# Patient Record
Sex: MALE | Race: WHITE | NOT HISPANIC OR LATINO | URBAN - METROPOLITAN AREA
[De-identification: names, ages, dates, MRNs, and addresses within clinical notes are randomized per-mention and may not be internally consistent; named-entity substitution may affect disease eponyms.]

---

## 2020-08-27 ENCOUNTER — OFFICE VISIT (OUTPATIENT)
Dept: URBAN - METROPOLITAN AREA TELEHEALTH 2 | Facility: TELEHEALTH | Age: 75
End: 2020-08-27
Payer: MEDICARE

## 2020-08-27 ENCOUNTER — LAB OUTSIDE AN ENCOUNTER (OUTPATIENT)
Dept: URBAN - METROPOLITAN AREA TELEHEALTH 2 | Facility: TELEHEALTH | Age: 75
End: 2020-08-27

## 2020-08-27 ENCOUNTER — OFFICE VISIT (OUTPATIENT)
Dept: URBAN - METROPOLITAN AREA TELEHEALTH 2 | Facility: TELEHEALTH | Age: 75
End: 2020-08-27

## 2020-08-27 DIAGNOSIS — K50.90 CROHN DISEASE: ICD-10-CM

## 2020-08-27 DIAGNOSIS — K50.80 CROHN'S COLITIS: ICD-10-CM

## 2020-08-27 DIAGNOSIS — R10.11 RUQ ABDOMINAL PAIN: ICD-10-CM

## 2020-08-27 DIAGNOSIS — K21.9 GERD: ICD-10-CM

## 2020-08-27 DIAGNOSIS — K92.1 BLACK STOOL: ICD-10-CM

## 2020-08-27 DIAGNOSIS — R10.13 EPIGASTRIC ABDOMINAL PAIN: ICD-10-CM

## 2020-08-27 PROBLEM — 301717006: Status: ACTIVE | Noted: 2020-08-27

## 2020-08-27 PROCEDURE — 99203 OFFICE O/P NEW LOW 30 MIN: CPT | Performed by: INTERNAL MEDICINE

## 2020-08-27 PROCEDURE — G8421 BMI NOT CALCULATED: HCPCS | Performed by: INTERNAL MEDICINE

## 2020-08-27 PROCEDURE — G8427 DOCREV CUR MEDS BY ELIG CLIN: HCPCS | Performed by: INTERNAL MEDICINE

## 2020-08-27 PROCEDURE — 1036F TOBACCO NON-USER: CPT | Performed by: INTERNAL MEDICINE

## 2020-08-27 PROCEDURE — G9903 PT SCRN TBCO ID AS NON USER: HCPCS | Performed by: INTERNAL MEDICINE

## 2020-08-27 RX ORDER — HYDROCORTISONE 100 MG/60ML
ENEMA RECTAL
Qty: 0 | Refills: 0 | Status: ACTIVE | COMMUNITY
Start: 1900-01-01

## 2020-08-27 RX ORDER — AZATHIOPRINE 50 1/1
TAKE 1 TABLET (50 MG) BY ORAL ROUTE ONCE DAILY TABLET ORAL ONCE A DAY
Status: ACTIVE | COMMUNITY

## 2020-08-27 RX ORDER — AMLODIPINE BESYLATE 10 MG/1
1 TABLET TABLET ORAL ONCE A DAY
Status: ACTIVE | COMMUNITY

## 2020-08-27 RX ORDER — HYDROCORTISONE 100 MG/60ML
ENEMA RECTAL
Qty: 0 | Refills: 0 | Status: DISCONTINUED | COMMUNITY
Start: 1900-01-01

## 2020-08-27 RX ORDER — PREDNISONE 5 MG/1
TAPER: 4 TABLETS BY MOUTH DAILY FOR 5 DAYS; THEN, 3 TABLETS BY FOR 5 DAYS; THEN, 2 TABLETS DAILY; THEN, 1 TABLET DAILY TABLET ORAL
Qty: 50 | Refills: 0 | Status: DISCONTINUED | COMMUNITY
Start: 2015-06-05

## 2020-08-27 NOTE — HPI-TODAY'S VISIT:
Mr. Butch Aquino is a 75-year-old  male who was seen today for the first time over the telephone visit for the complaints of having severe severe epigastric abdominal pain that started in the morning associated with some black tarry stools.  Patient stated he has had constipation intermittently for the past few months months he has noticed few episodes of dark stools and he reports emesis of dark black stools.  Today morning he woke up with a severe epigastric abdominal pain followed by some nausea and some black stools he denies any hematemesis denies any coffee-ground emesis denies any bright red per rectum. Patient gives history of having Crohn's disease that involving : And he follows with Dr. Nicholas albarran last seen him was a year ago.  Patient reports last colonoscopy was a year ago and he is in remission.  He was noted to have Crohn's colitis since 1981.  He reports having history of perforated colon after he had a colonoscopy in the year 2000 at that time he had surgery for that.  Patient stated his Crohn's disease in remission denies any fistulas and Crohn's disease.  Last colonoscopy he was told to be has mucosal remission currently he is not as that they have been daily for the in for the colitis.  He was also seen last in 2015 in our practice and at that time I noticed a CT scan that was done showed some gallstones and as well as liver cyst.  Patient was not aware of having gallstones he denies any intermittent right upper quadrant pain.  Denies taking any NSAIDs a daily basis except for an aspirin he is also on 20 mg of Nexium.

## 2020-08-28 ENCOUNTER — CLAIMS CREATED FROM THE CLAIM WINDOW (OUTPATIENT)
Dept: URBAN - METROPOLITAN AREA CLINIC 4 | Facility: CLINIC | Age: 75
End: 2020-08-28
Payer: MEDICARE

## 2020-08-28 ENCOUNTER — OFFICE VISIT (OUTPATIENT)
Dept: URBAN - METROPOLITAN AREA SURGERY CENTER 13 | Facility: SURGERY CENTER | Age: 75
End: 2020-08-28
Payer: MEDICARE

## 2020-08-28 DIAGNOSIS — K29.60 OTHER GASTRITIS WITHOUT BLEEDING: ICD-10-CM

## 2020-08-28 DIAGNOSIS — K22.2 ACQUIRED ESOPHAGEAL RING: ICD-10-CM

## 2020-08-28 DIAGNOSIS — R10.13 ABDOMINAL DISCOMFORT, EPIGASTRIC: ICD-10-CM

## 2020-08-28 DIAGNOSIS — K31.89 ACQUIRED DEFORMITY OF DUODENUM: ICD-10-CM

## 2020-08-28 PROCEDURE — 88305 TISSUE EXAM BY PATHOLOGIST: CPT | Performed by: PATHOLOGY

## 2020-08-28 PROCEDURE — 43239 EGD BIOPSY SINGLE/MULTIPLE: CPT | Performed by: INTERNAL MEDICINE

## 2020-08-28 PROCEDURE — 88312 SPECIAL STAINS GROUP 1: CPT | Performed by: PATHOLOGY

## 2020-08-28 PROCEDURE — G8907 PT DOC NO EVENTS ON DISCHARG: HCPCS | Performed by: INTERNAL MEDICINE

## 2020-08-28 PROCEDURE — 43248 EGD GUIDE WIRE INSERTION: CPT | Performed by: INTERNAL MEDICINE

## 2020-08-28 RX ORDER — SUCRALFATE 1 G/1
1 TABLET ON AN EMPTY STOMACH TABLET ORAL TWICE A DAY
Qty: 60 TABLET | Refills: 0 | OUTPATIENT
Start: 2020-08-28 | End: 2020-09-27

## 2020-08-28 RX ORDER — PANTOPRAZOLE 40 MG/1
1 TABLET TABLET, DELAYED RELEASE ORAL ONCE A DAY
Qty: 90 TABLET | Refills: 1 | OUTPATIENT
Start: 2020-08-28

## 2020-08-28 RX ORDER — AMLODIPINE BESYLATE 10 MG/1
1 TABLET TABLET ORAL ONCE A DAY
Status: ACTIVE | COMMUNITY

## 2020-08-28 RX ORDER — AZATHIOPRINE 50 1/1
TAKE 1 TABLET (50 MG) BY ORAL ROUTE ONCE DAILY TABLET ORAL ONCE A DAY
Status: ACTIVE | COMMUNITY

## 2020-08-29 LAB
A/G RATIO: 1.9
ALBUMIN: 4.4
ALKALINE PHOSPHATASE: 34
ALT (SGPT): 6
AST (SGOT): 15
BASO (ABSOLUTE): 0
BASOS: 0
BILIRUBIN, TOTAL: 0.3
BUN/CREATININE RATIO: 12
BUN: 28
CALCIUM: 9.4
CARBON DIOXIDE, TOTAL: 21
CHLORIDE: 108
CREATININE: 2.27
EGFR IF AFRICN AM: 31
EGFR IF NONAFRICN AM: 27
EOS (ABSOLUTE): 0.2
EOS: 3
GLOBULIN, TOTAL: 2.3
GLUCOSE: 89
HEMATOCRIT: 38.2
HEMATOLOGY COMMENTS:: (no result)
HEMOGLOBIN: 12.2
IMMATURE CELLS: (no result)
IMMATURE GRANS (ABS): 0
IMMATURE GRANULOCYTES: 0
LYMPHS (ABSOLUTE): 0.9
LYMPHS: 14
MCH: 32.5
MCHC: 31.9
MCV: 102
MONOCYTES(ABSOLUTE): 0.5
MONOCYTES: 8
NEUTROPHILS (ABSOLUTE): 4.5
NEUTROPHILS: 75
NRBC: (no result)
PLATELETS: 226
POTASSIUM: 4.5
PROTEIN, TOTAL: 6.7
RBC: 3.75
RDW: 13.1
SODIUM: 144
WBC: 6.1

## 2020-09-04 ENCOUNTER — LAB OUTSIDE AN ENCOUNTER (OUTPATIENT)
Dept: URBAN - METROPOLITAN AREA CLINIC 82 | Facility: CLINIC | Age: 75
End: 2020-09-04

## 2020-09-10 ENCOUNTER — OFFICE VISIT (OUTPATIENT)
Dept: URBAN - METROPOLITAN AREA TELEHEALTH 2 | Facility: TELEHEALTH | Age: 75
End: 2020-09-10
Payer: MEDICARE

## 2020-09-10 DIAGNOSIS — K50.90 CROHN DISEASE: ICD-10-CM

## 2020-09-10 DIAGNOSIS — K59.01 SLOW TRANSIT CONSTIPATION: ICD-10-CM

## 2020-09-10 DIAGNOSIS — K76.89 LIVER CYST: ICD-10-CM

## 2020-09-10 DIAGNOSIS — K80.20 GALLSTONES: ICD-10-CM

## 2020-09-10 PROBLEM — 79922009: Status: ACTIVE | Noted: 2020-08-27

## 2020-09-10 PROCEDURE — 99214 OFFICE O/P EST MOD 30 MIN: CPT | Performed by: INTERNAL MEDICINE

## 2020-09-10 PROCEDURE — 1036F TOBACCO NON-USER: CPT | Performed by: INTERNAL MEDICINE

## 2020-09-10 PROCEDURE — G8427 DOCREV CUR MEDS BY ELIG CLIN: HCPCS | Performed by: INTERNAL MEDICINE

## 2020-09-10 PROCEDURE — G9903 PT SCRN TBCO ID AS NON USER: HCPCS | Performed by: INTERNAL MEDICINE

## 2020-09-10 PROCEDURE — G8420 CALC BMI NORM PARAMETERS: HCPCS | Performed by: INTERNAL MEDICINE

## 2020-09-10 RX ORDER — PANTOPRAZOLE 40 MG/1
1 TABLET TABLET, DELAYED RELEASE ORAL ONCE A DAY
Qty: 90 TABLET | Refills: 1 | Status: ACTIVE | COMMUNITY
Start: 2020-08-28

## 2020-09-10 RX ORDER — AZATHIOPRINE 50 1/1
TAKE 1 TABLET (50 MG) BY ORAL ROUTE ONCE DAILY TABLET ORAL ONCE A DAY
Status: ACTIVE | COMMUNITY

## 2020-09-10 RX ORDER — SUCRALFATE 1 G/1
1 TABLET ON AN EMPTY STOMACH TABLET ORAL TWICE A DAY
Qty: 60 TABLET | Refills: 0 | Status: ACTIVE | COMMUNITY
Start: 2020-08-28 | End: 2020-09-27

## 2020-09-10 RX ORDER — AMLODIPINE BESYLATE 10 MG/1
1 TABLET TABLET ORAL ONCE A DAY
Status: ACTIVE | COMMUNITY

## 2020-09-10 RX ORDER — LINACLOTIDE 72 UG/1
1 CAPSULE AT LEAST 30 MINUTES BEFORE THE FIRST MEAL OF THE DAY ON AN EMPTY STOMACH CAPSULE, GELATIN COATED ORAL ONCE A DAY
Qty: 90 CAPSULE | Refills: 1 | OUTPATIENT
Start: 2020-09-10 | End: 2021-03-08

## 2020-09-10 NOTE — HPI-TODAY'S VISIT:
Mr. Butch Aquino is a 75-year-old  male who was seen today for follow up after the EGD. He reports melena has improved.  He denies diarrhea but c/o more constiaption.  Patient stated he has had constipation intermittently for the past few months months he has noticed few episodes of dark stools and he reports emesis of dark black stools.  Patient gives history of having Crohn's disease that involving : And he follows with Dr. Nicholas albarran last seen him was a year ago.  Patient reports last colonoscopy was a year ago and he is in remission.  He was noted to have Crohn's colitis since 1981.  He reports having history of perforated colon after he had a colonoscopy in the year 2000 at that time he had surgery for that.  Patient stated his Crohn's disease in remission denies any fistulas and Crohn's disease.  Last colonoscopy he was told to be has mucosal remission currently he is not as that they have been daily for the in for the colitis.  He was also seen last in 2015 in our practice and at that time I noticed a CT scan that was done showed some gallstones and as well as liver cyst.  Patient was not aware of having gallstones he denies any intermittent right upper quadrant pain.   Since he was last seen , EGD , RUQ US and labs were done. EGD showed erosive gastritis , negative for h.pylori infection. RUQ US showed gallstones, no thickening of the GB wall. Multiple cysts in the liver and kidney. Labs showed mild megaloblastic anemia and creatinine 2.5 . Pt reports hx of CKD.

## 2020-09-21 ENCOUNTER — ERX REFILL RESPONSE (OUTPATIENT)
Dept: URBAN - METROPOLITAN AREA SURGERY CENTER 13 | Facility: SURGERY CENTER | Age: 75
End: 2020-09-21

## 2020-09-21 RX ORDER — SUCRALFATE 1 G/1
1 TABLET ON AN EMPTY STOMACH TABLET ORAL TWICE A DAY
Qty: 60 | Refills: 0

## 2020-10-18 ENCOUNTER — ERX REFILL RESPONSE (OUTPATIENT)
Dept: URBAN - METROPOLITAN AREA SURGERY CENTER 13 | Facility: SURGERY CENTER | Age: 75
End: 2020-10-18

## 2020-10-18 RX ORDER — SUCRALFATE 1 G/1
1 TABLET ON AN EMPTY STOMACH TABLET ORAL TWICE A DAY
Qty: 60 | Refills: 0

## 2020-11-25 ENCOUNTER — ERX REFILL RESPONSE (OUTPATIENT)
Dept: URBAN - METROPOLITAN AREA SURGERY CENTER 13 | Facility: SURGERY CENTER | Age: 75
End: 2020-11-25

## 2020-11-25 RX ORDER — SUCRALFATE 1 G/1
1 TABLET ON AN EMPTY STOMACH TABLET ORAL TWICE A DAY
Qty: 60 | Refills: 0

## 2020-12-03 ENCOUNTER — OFFICE VISIT (OUTPATIENT)
Dept: URBAN - METROPOLITAN AREA TELEHEALTH 2 | Facility: TELEHEALTH | Age: 75
End: 2020-12-03

## 2020-12-10 ENCOUNTER — TELEPHONE ENCOUNTER (OUTPATIENT)
Dept: URBAN - METROPOLITAN AREA CLINIC 92 | Facility: CLINIC | Age: 75
End: 2020-12-10

## 2020-12-10 ENCOUNTER — LAB OUTSIDE AN ENCOUNTER (OUTPATIENT)
Dept: URBAN - METROPOLITAN AREA TELEHEALTH 2 | Facility: TELEHEALTH | Age: 75
End: 2020-12-10

## 2020-12-10 ENCOUNTER — OFFICE VISIT (OUTPATIENT)
Dept: URBAN - METROPOLITAN AREA TELEHEALTH 2 | Facility: TELEHEALTH | Age: 75
End: 2020-12-10
Payer: MEDICARE

## 2020-12-10 DIAGNOSIS — K50.80 CROHN'S COLITIS: ICD-10-CM

## 2020-12-10 DIAGNOSIS — K21.9 GERD: ICD-10-CM

## 2020-12-10 DIAGNOSIS — K29.60 EROSIVE GASTRITIS: ICD-10-CM

## 2020-12-10 DIAGNOSIS — K80.20 GALLSTONES: ICD-10-CM

## 2020-12-10 PROCEDURE — G9906 PT RECV TBCO CESS INTERV: HCPCS | Performed by: INTERNAL MEDICINE

## 2020-12-10 PROCEDURE — G8420 CALC BMI NORM PARAMETERS: HCPCS | Performed by: INTERNAL MEDICINE

## 2020-12-10 PROCEDURE — G8482 FLU IMMUNIZE ORDER/ADMIN: HCPCS | Performed by: INTERNAL MEDICINE

## 2020-12-10 PROCEDURE — 99213 OFFICE O/P EST LOW 20 MIN: CPT | Performed by: INTERNAL MEDICINE

## 2020-12-10 PROCEDURE — 4004F PT TOBACCO SCREEN RCVD TLK: CPT | Performed by: INTERNAL MEDICINE

## 2020-12-10 PROCEDURE — G9902 PT SCRN TBCO AND ID AS USER: HCPCS | Performed by: INTERNAL MEDICINE

## 2020-12-10 PROCEDURE — G8427 DOCREV CUR MEDS BY ELIG CLIN: HCPCS | Performed by: INTERNAL MEDICINE

## 2020-12-10 RX ORDER — PANTOPRAZOLE 40 MG/1
1 TABLET TABLET, DELAYED RELEASE ORAL ONCE A DAY
Qty: 90
Start: 2020-08-28

## 2020-12-10 RX ORDER — AMLODIPINE BESYLATE 10 MG/1
1 TABLET TABLET ORAL ONCE A DAY
Status: ACTIVE | COMMUNITY

## 2020-12-10 RX ORDER — LINACLOTIDE 72 UG/1
1 CAPSULE AT LEAST 30 MINUTES BEFORE THE FIRST MEAL OF THE DAY ON AN EMPTY STOMACH CAPSULE, GELATIN COATED ORAL ONCE A DAY
Qty: 90 CAPSULE | Refills: 1 | OUTPATIENT

## 2020-12-10 RX ORDER — SUCRALFATE 1 G/1
1 TABLET ON AN EMPTY STOMACH TABLET ORAL TWICE A DAY
Qty: 60 | Refills: 0 | Status: ACTIVE | COMMUNITY

## 2020-12-10 RX ORDER — LINACLOTIDE 72 UG/1
1 CAPSULE AT LEAST 30 MINUTES BEFORE THE FIRST MEAL OF THE DAY ON AN EMPTY STOMACH CAPSULE, GELATIN COATED ORAL ONCE A DAY
Qty: 90 CAPSULE | Refills: 1 | Status: ACTIVE | COMMUNITY
Start: 2020-09-10 | End: 2021-03-08

## 2020-12-10 RX ORDER — PANTOPRAZOLE 40 MG/1
1 TABLET TABLET, DELAYED RELEASE ORAL ONCE A DAY
Qty: 90 TABLET | Refills: 1 | Status: ACTIVE | COMMUNITY
Start: 2020-08-28

## 2020-12-10 RX ORDER — AZATHIOPRINE 50 1/1
TAKE 1 TABLET (50 MG) BY ORAL ROUTE ONCE DAILY TABLET ORAL ONCE A DAY
Status: ACTIVE | COMMUNITY

## 2020-12-10 NOTE — HPI-TODAY'S VISIT:
Mr. Butch Aquino is a 75-year-old  male who was seen today for follow up after the EGD. He reports melena has improved.  Patient gives history of having Crohn's disease that involving : And he follows with Dr. Nicholas albarran last seen him was a year ago.  Patient reports last colonoscopy was a year ago and he is in remission.  He was noted to have Crohn's colitis since 1981.  He reports having history of perforated colon after he had a colonoscopy in the year 2000 at that time he had surgery for that.  Patient stated his Crohn's disease in remission denies any fistulas and Crohn's disease.  Last colonoscopy he was told to be has mucosal remission currently he is not as that they have been daily for the in for the colitis.  He was also seen last in 2015 in our practice and at that time I noticed a CT scan that was done showed some gallstones and as well as liver cyst.  Patient was not aware of having gallstones he denies any intermittent right upper quadrant pain.   Since he was last seen , EGD , RUQ US and labs were done. EGD showed erosive gastritis , negative for h.pylori infection. RUQ US showed gallstones, no thickening of the GB wall. Multiple cysts in the liver and kidney. Labs showed mild megaloblastic anemia and creatinine 2.5 . Pt reports hx of CKD.  Only reports constipation responding to linzess 72 mcg but had diarrhea. He aslo reports choaking and spasm in the distal esophagus. EGD showed Schatzki's ring. Patient denies any weight loss and is complaint to all meds including azathioprine. Pt reports he is due for surveillance colonoscopy this year.

## 2020-12-21 ENCOUNTER — ERX REFILL RESPONSE (OUTPATIENT)
Dept: URBAN - METROPOLITAN AREA SURGERY CENTER 13 | Facility: SURGERY CENTER | Age: 75
End: 2020-12-21

## 2020-12-21 RX ORDER — SUCRALFATE 1 G/1
1 TABLET ON AN EMPTY STOMACH TABLET ORAL TWICE A DAY
Qty: 60 | Refills: 0

## 2020-12-28 ENCOUNTER — ERX REFILL RESPONSE (OUTPATIENT)
Dept: URBAN - METROPOLITAN AREA SURGERY CENTER 13 | Facility: SURGERY CENTER | Age: 75
End: 2020-12-28

## 2020-12-28 RX ORDER — SUCRALFATE 1 G/1
1 TABLET ON AN EMPTY STOMACH TABLET ORAL TWICE A DAY
Qty: 60 | Refills: 0

## 2021-01-29 ENCOUNTER — ERX REFILL RESPONSE (OUTPATIENT)
Dept: URBAN - METROPOLITAN AREA SURGERY CENTER 13 | Facility: SURGERY CENTER | Age: 76
End: 2021-01-29

## 2021-01-29 RX ORDER — SUCRALFATE 1 G/1
1 TABLET ON AN EMPTY STOMACH TABLET ORAL TWICE A DAY
Qty: 60 | Refills: 0

## 2021-02-18 ENCOUNTER — OFFICE VISIT (OUTPATIENT)
Dept: URBAN - METROPOLITAN AREA SURGERY CENTER 13 | Facility: SURGERY CENTER | Age: 76
End: 2021-02-18

## 2021-02-24 ENCOUNTER — ERX REFILL RESPONSE (OUTPATIENT)
Dept: URBAN - METROPOLITAN AREA CLINIC 82 | Facility: CLINIC | Age: 76
End: 2021-02-24

## 2021-02-24 RX ORDER — PANTOPRAZOLE SODIUM 40 MG/1
1 TABLET TABLET, DELAYED RELEASE ORAL ONCE A DAY
Qty: 90 | Refills: 1

## 2021-03-15 ENCOUNTER — ERX REFILL RESPONSE (OUTPATIENT)
Dept: URBAN - METROPOLITAN AREA CLINIC 82 | Facility: CLINIC | Age: 76
End: 2021-03-15

## 2021-03-15 RX ORDER — SUCRALFATE 1 G/1
1 TABLET ON AN EMPTY STOMACH TABLET ORAL TWICE A DAY
Qty: 60 | Refills: 0

## 2021-03-16 ENCOUNTER — OFFICE VISIT (OUTPATIENT)
Dept: URBAN - METROPOLITAN AREA SURGERY CENTER 13 | Facility: SURGERY CENTER | Age: 76
End: 2021-03-16

## 2021-04-16 ENCOUNTER — ERX REFILL RESPONSE (OUTPATIENT)
Dept: URBAN - METROPOLITAN AREA CLINIC 82 | Facility: CLINIC | Age: 76
End: 2021-04-16

## 2021-04-16 RX ORDER — SUCRALFATE 1 G/1
1 TABLET ON AN EMPTY STOMACH TABLET ORAL TWICE A DAY
Qty: 60 | Refills: 0

## 2021-04-19 ENCOUNTER — OFFICE VISIT (OUTPATIENT)
Dept: URBAN - METROPOLITAN AREA SURGERY CENTER 13 | Facility: SURGERY CENTER | Age: 76
End: 2021-04-19

## 2021-05-10 ENCOUNTER — ERX REFILL RESPONSE (OUTPATIENT)
Dept: URBAN - METROPOLITAN AREA CLINIC 82 | Facility: CLINIC | Age: 76
End: 2021-05-10

## 2021-05-10 RX ORDER — SUCRALFATE 1 G/1
1 TABLET ON AN EMPTY STOMACH TABLET ORAL TWICE A DAY
Qty: 60 | Refills: 0

## 2021-05-20 ENCOUNTER — OFFICE VISIT (OUTPATIENT)
Dept: URBAN - METROPOLITAN AREA SURGERY CENTER 13 | Facility: SURGERY CENTER | Age: 76
End: 2021-05-20

## 2021-06-13 ENCOUNTER — ERX REFILL RESPONSE (OUTPATIENT)
Dept: URBAN - METROPOLITAN AREA CLINIC 82 | Facility: CLINIC | Age: 76
End: 2021-06-13

## 2021-06-13 RX ORDER — SUCRALFATE 1 G/1
1 TABLET ON AN EMPTY STOMACH TABLET ORAL TWICE A DAY
Qty: 60 | Refills: 0

## 2021-06-15 ENCOUNTER — OFFICE VISIT (OUTPATIENT)
Dept: URBAN - METROPOLITAN AREA SURGERY CENTER 13 | Facility: SURGERY CENTER | Age: 76
End: 2021-06-15
Payer: MEDICARE

## 2021-06-15 ENCOUNTER — CLAIMS CREATED FROM THE CLAIM WINDOW (OUTPATIENT)
Dept: URBAN - METROPOLITAN AREA CLINIC 4 | Facility: CLINIC | Age: 76
End: 2021-06-15
Payer: MEDICARE

## 2021-06-15 DIAGNOSIS — K63.89 HEPATIC FLEXURE MASS: ICD-10-CM

## 2021-06-15 DIAGNOSIS — K63.89 BACTERIAL OVERGROWTH SYNDROME: ICD-10-CM

## 2021-06-15 DIAGNOSIS — K50.10 CC (CROHN'S COLITIS): ICD-10-CM

## 2021-06-15 PROCEDURE — 45380 COLONOSCOPY AND BIOPSY: CPT | Performed by: INTERNAL MEDICINE

## 2021-06-15 PROCEDURE — G8907 PT DOC NO EVENTS ON DISCHARG: HCPCS | Performed by: INTERNAL MEDICINE

## 2021-06-15 PROCEDURE — 88305 TISSUE EXAM BY PATHOLOGIST: CPT | Performed by: PATHOLOGY

## 2021-07-12 ENCOUNTER — ERX REFILL RESPONSE (OUTPATIENT)
Dept: URBAN - METROPOLITAN AREA CLINIC 82 | Facility: CLINIC | Age: 76
End: 2021-07-12

## 2021-07-12 RX ORDER — SUCRALFATE 1 G/1
1 TABLET ON AN EMPTY STOMACH TABLET ORAL TWICE A DAY
Qty: 60 | Refills: 0 | OUTPATIENT

## 2021-07-12 RX ORDER — SUCRALFATE 1 G/1
TAKE 1 TABLET BY MOUTH TWICE A DAY ON AN EMPTY STOMACH TABLET ORAL
Qty: 60 TABLET | Refills: 1 | OUTPATIENT

## 2021-07-15 ENCOUNTER — OFFICE VISIT (OUTPATIENT)
Dept: URBAN - METROPOLITAN AREA TELEHEALTH 2 | Facility: TELEHEALTH | Age: 76
End: 2021-07-15

## 2021-07-26 ENCOUNTER — OFFICE VISIT (OUTPATIENT)
Dept: URBAN - METROPOLITAN AREA TELEHEALTH 2 | Facility: TELEHEALTH | Age: 76
End: 2021-07-26
Payer: MEDICARE

## 2021-07-26 DIAGNOSIS — K29.60 EROSIVE GASTRITIS: ICD-10-CM

## 2021-07-26 DIAGNOSIS — K21.9 GERD: ICD-10-CM

## 2021-07-26 DIAGNOSIS — K80.20 GALLSTONES: ICD-10-CM

## 2021-07-26 DIAGNOSIS — K50.80 CROHN'S COLITIS: ICD-10-CM

## 2021-07-26 PROCEDURE — 99213 OFFICE O/P EST LOW 20 MIN: CPT | Performed by: INTERNAL MEDICINE

## 2021-07-26 RX ORDER — SUCRALFATE 1 G/1
TAKE 1 TABLET BY MOUTH TWICE A DAY ON AN EMPTY STOMACH TABLET ORAL
Qty: 60 TABLET | Refills: 1 | Status: ON HOLD | COMMUNITY

## 2021-07-26 RX ORDER — PANTOPRAZOLE SODIUM 40 MG/1
1 TABLET TABLET, DELAYED RELEASE ORAL ONCE A DAY
Qty: 90 | Refills: 1 | Status: ACTIVE | COMMUNITY

## 2021-07-26 RX ORDER — PANTOPRAZOLE 40 MG/1
1 TABLET TABLET, DELAYED RELEASE ORAL ONCE A DAY
Qty: 90

## 2021-07-26 RX ORDER — AMLODIPINE BESYLATE 10 MG/1
1 TABLET TABLET ORAL ONCE A DAY
Status: ACTIVE | COMMUNITY

## 2021-07-26 RX ORDER — LINACLOTIDE 72 UG/1
1 CAPSULE AT LEAST 30 MINUTES BEFORE THE FIRST MEAL OF THE DAY ON AN EMPTY STOMACH CAPSULE, GELATIN COATED ORAL ONCE A DAY
Qty: 90 CAPSULE | Refills: 1 | Status: ACTIVE | COMMUNITY

## 2021-07-26 RX ORDER — AZATHIOPRINE 50 1/1
TAKE 1 TABLET (50 MG) BY ORAL ROUTE ONCE DAILY TABLET ORAL ONCE A DAY
Status: ACTIVE | COMMUNITY

## 2021-07-26 RX ORDER — LINACLOTIDE 72 UG/1
1 CAPSULE AT LEAST 30 MINUTES BEFORE THE FIRST MEAL OF THE DAY ON AN EMPTY STOMACH CAPSULE, GELATIN COATED ORAL ONCE A DAY
Qty: 90 CAPSULE | Refills: 1 | OUTPATIENT

## 2021-07-26 NOTE — HPI-TODAY'S VISIT:
Mr. Butch Aquino was seen today for the follow-up.  He is accompanied by his son brother during the visit.  He reports GI symptoms are very well controlled except for having intermittent choking sensation.  He recently had an episode of food getting stuck into the upper esophagus however he admits to eating a big bite of meat.  He denies any daily dysphagia.  He has had an upper endoscopy from August 2020 that showed Schatzki's ring which was dilated.  He is on PPI once a day as well as Carafate.  He denies taking any other NSAIDs except for aspirin.  He recently underwent colonoscopy for some surveillance purposes for Crohn's disease which revealed multiple pseudopolyps however there was no evidence of active Crohn's and history of Crohn's in remission.  Patient also reports having some anal spasms and discomfort after having a bowel movement.  He denies having any rectal bleeding.  His bowel movements are 1 to 2/day.

## 2021-08-11 ENCOUNTER — ERX REFILL RESPONSE (OUTPATIENT)
Dept: URBAN - METROPOLITAN AREA CLINIC 82 | Facility: CLINIC | Age: 76
End: 2021-08-11

## 2021-08-11 RX ORDER — SUCRALFATE 1 G/1
TAKE 1 TABLET BY MOUTH TWICE A DAY ON AN EMPTY STOMACH TABLET ORAL
Qty: 60 TABLET | Refills: 1 | OUTPATIENT

## 2021-08-17 ENCOUNTER — ERX REFILL RESPONSE (OUTPATIENT)
Dept: URBAN - METROPOLITAN AREA CLINIC 82 | Facility: CLINIC | Age: 76
End: 2021-08-17

## 2021-08-17 RX ORDER — PANTOPRAZOLE SODIUM 40 MG/1
1 TABLET TABLET, DELAYED RELEASE ORAL ONCE A DAY
Qty: 90 | Refills: 1 | OUTPATIENT

## 2021-08-17 RX ORDER — PANTOPRAZOLE SODIUM 40 MG/1
TAKE 1 TABLET BY MOUTH EVERY DAY TABLET, DELAYED RELEASE ORAL
Qty: 90 TABLET | Refills: 2 | OUTPATIENT

## 2021-12-15 ENCOUNTER — TELEPHONE ENCOUNTER (OUTPATIENT)
Dept: URBAN - METROPOLITAN AREA CLINIC 82 | Facility: CLINIC | Age: 76
End: 2021-12-15

## 2022-01-11 ENCOUNTER — LAB OUTSIDE AN ENCOUNTER (OUTPATIENT)
Dept: URBAN - METROPOLITAN AREA TELEHEALTH 2 | Facility: TELEHEALTH | Age: 77
End: 2022-01-11

## 2022-01-11 ENCOUNTER — TELEPHONE ENCOUNTER (OUTPATIENT)
Dept: URBAN - METROPOLITAN AREA CLINIC 92 | Facility: CLINIC | Age: 77
End: 2022-01-11

## 2022-01-11 ENCOUNTER — OFFICE VISIT (OUTPATIENT)
Dept: URBAN - METROPOLITAN AREA TELEHEALTH 2 | Facility: TELEHEALTH | Age: 77
End: 2022-01-11
Payer: MEDICARE

## 2022-01-11 DIAGNOSIS — K22.4 ESOPHAGEAL SPASM: ICD-10-CM

## 2022-01-11 DIAGNOSIS — R13.14 PHARYNGOESOPHAGEAL DYSPHAGIA: ICD-10-CM

## 2022-01-11 DIAGNOSIS — K29.60 EROSIVE GASTRITIS: ICD-10-CM

## 2022-01-11 DIAGNOSIS — K50.80 CROHN'S COLITIS: ICD-10-CM

## 2022-01-11 DIAGNOSIS — D53.1 MEGALOBLASTIC ANEMIA: ICD-10-CM

## 2022-01-11 DIAGNOSIS — K59.01 SLOW TRANSIT CONSTIPATION: ICD-10-CM

## 2022-01-11 DIAGNOSIS — N28.1 RENAL CYST: ICD-10-CM

## 2022-01-11 DIAGNOSIS — K80.20 GALLSTONES: ICD-10-CM

## 2022-01-11 DIAGNOSIS — K21.9 GERD: ICD-10-CM

## 2022-01-11 DIAGNOSIS — K22.2 SCHATZKI'S RING: ICD-10-CM

## 2022-01-11 DIAGNOSIS — K76.89 LIVER CYST: ICD-10-CM

## 2022-01-11 PROBLEM — 1086791000119100: Status: ACTIVE | Noted: 2020-09-10

## 2022-01-11 PROBLEM — 235919008: Status: ACTIVE | Noted: 2020-09-10

## 2022-01-11 PROCEDURE — 99214 OFFICE O/P EST MOD 30 MIN: CPT | Performed by: INTERNAL MEDICINE

## 2022-01-11 RX ORDER — AMLODIPINE BESYLATE 10 MG/1
1 TABLET TABLET ORAL ONCE A DAY
Status: ACTIVE | COMMUNITY

## 2022-01-11 RX ORDER — PANTOPRAZOLE SODIUM 40 MG/1
TAKE 1 TABLET BY MOUTH EVERY DAY TABLET, DELAYED RELEASE ORAL
Qty: 90 TABLET | Refills: 2 | Status: ACTIVE | COMMUNITY

## 2022-01-11 RX ORDER — AZATHIOPRINE 50 1/1
TAKE 1 TABLET (50 MG) BY ORAL ROUTE ONCE DAILY TABLET ORAL ONCE A DAY
Qty: 90 TABLET | Refills: 1

## 2022-01-11 RX ORDER — AZATHIOPRINE 50 1/1
TAKE 1 TABLET (50 MG) BY ORAL ROUTE ONCE DAILY TABLET ORAL ONCE A DAY
Status: ACTIVE | COMMUNITY

## 2022-01-11 RX ORDER — LINACLOTIDE 72 UG/1
1 CAPSULE AT LEAST 30 MINUTES BEFORE THE FIRST MEAL OF THE DAY ON AN EMPTY STOMACH CAPSULE, GELATIN COATED ORAL ONCE A DAY
Qty: 90 CAPSULE | Refills: 1 | Status: ACTIVE | COMMUNITY

## 2022-01-11 RX ORDER — PANTOPRAZOLE 40 MG/1
1 TABLET TABLET, DELAYED RELEASE ORAL ONCE A DAY
Qty: 90

## 2022-01-11 RX ORDER — SUCRALFATE 1 G/1
TAKE 1 TABLET BY MOUTH TWICE A DAY ON AN EMPTY STOMACH TABLET ORAL
Qty: 60 TABLET | Refills: 1 | Status: ACTIVE | COMMUNITY

## 2022-01-11 RX ORDER — LINACLOTIDE 72 UG/1
1 CAPSULE AT LEAST 30 MINUTES BEFORE THE FIRST MEAL OF THE DAY ON AN EMPTY STOMACH CAPSULE, GELATIN COATED ORAL ONCE A DAY
Qty: 90 CAPSULE | Refills: 1 | OUTPATIENT

## 2022-01-11 NOTE — HPI-TODAY'S VISIT:
Mr. Butch Aquino was seen today for the follow-up.  He is accompanied by his son brother during the visit.  He reports GI symptoms are very well controlled except for having intermittent choking sensation.  He recently had an episode of food getting stuck into the upper esophagus however he admits to eating a big bite of meat.  He denies any daily dysphagia.  He has had an upper endoscopy from August 2020 that showed Schatzki's ring which was dilated.  He is on PPI once a day as well as Carafate.  He denies taking any other NSAIDs except for aspirin.  He recently underwent colonoscopy for some surveillance purposes for Crohn's disease which revealed multiple pseudopolyps however there was no evidence of active Crohn's and history of Crohn's in remission.  Patient also reports having some anal spasms and discomfort after having a bowel movement.  He denies having any rectal bleeding.  His bowel movements are 1 to 2/day.  1/11/22: Mr. Butch Aquino today was seen over telehealth his major complaint is having epigastric as well as chest discomfort as well as chest pain.  Patient is being followed by his cardiologist and he reports normal echocardiogram normal EKG and he cannot identify obvious triggers however this chest pain symptoms symptoms are at random happen sometimes with eating sometimes exercise.  Patient reports that he has another ultrasound echocardiogram scheduled with a cardiologist.  He is noted to have gallstones and he denies right upper quadrant pain however he reports having indigestion and epigastric and chest discomfort after eating food.  He reports having a barium swallow done at [facility] I do not have those records available.  Regarding Crohn's disease patient reports stable symptoms and his last colonoscopy shows a remission and chronic pseudo polyps.  Patient currently on azathioprine daily.  Most recent labs were about 2 months ago do not have them available he reports having normal labs with his PCP.

## 2022-04-10 ENCOUNTER — ERX REFILL RESPONSE (OUTPATIENT)
Dept: URBAN - METROPOLITAN AREA CLINIC 82 | Facility: CLINIC | Age: 77
End: 2022-04-10

## 2022-04-10 RX ORDER — SUCRALFATE 1 G/1
TAKE 1 TABLET BY MOUTH TWICE A DAY ON AN EMPTY STOMACH 30 TABLET ORAL
Qty: 60 TABLET | Refills: 1 | OUTPATIENT

## 2022-04-10 RX ORDER — SUCRALFATE 1 G/1
TAKE 1 TABLET BY MOUTH TWICE A DAY ON AN EMPTY STOMACH TABLET ORAL
Qty: 60 TABLET | Refills: 1 | OUTPATIENT

## 2022-05-02 ENCOUNTER — ERX REFILL RESPONSE (OUTPATIENT)
Dept: URBAN - METROPOLITAN AREA CLINIC 82 | Facility: CLINIC | Age: 77
End: 2022-05-02

## 2022-05-02 RX ORDER — PANTOPRAZOLE SODIUM 40 MG/1
TAKE 1 TABLET BY MOUTH EVERY DAY FOR 90 DAYS TABLET, DELAYED RELEASE ORAL
Qty: 90 TABLET | Refills: 1 | OUTPATIENT

## 2022-05-02 RX ORDER — PANTOPRAZOLE 40 MG/1
1 TABLET TABLET, DELAYED RELEASE ORAL ONCE A DAY
Qty: 90 | OUTPATIENT

## 2022-05-13 ENCOUNTER — ERX REFILL RESPONSE (OUTPATIENT)
Dept: URBAN - METROPOLITAN AREA CLINIC 82 | Facility: CLINIC | Age: 77
End: 2022-05-13

## 2022-05-13 RX ORDER — SUCRALFATE 1 G/1
TAKE 1 TABLET BY MOUTH TWICE A DAY ON AN EMPTY STOMACH TABLET ORAL
Qty: 60 TABLET | Refills: 1 | OUTPATIENT

## 2022-05-13 RX ORDER — SUCRALFATE 1 G/1
TAKE 1 TABLET BY MOUTH TWICE A DAY ON AN EMPTY STOMACH 30 TABLET ORAL
Qty: 60 TABLET | Refills: 1 | OUTPATIENT

## 2022-08-01 ENCOUNTER — OFFICE VISIT (OUTPATIENT)
Dept: URBAN - METROPOLITAN AREA CLINIC 54 | Facility: CLINIC | Age: 77
End: 2022-08-01
Payer: MEDICARE

## 2022-08-01 ENCOUNTER — WEB ENCOUNTER (OUTPATIENT)
Dept: URBAN - METROPOLITAN AREA CLINIC 54 | Facility: CLINIC | Age: 77
End: 2022-08-01

## 2022-08-01 VITALS
HEART RATE: 71 BPM | SYSTOLIC BLOOD PRESSURE: 124 MMHG | DIASTOLIC BLOOD PRESSURE: 80 MMHG | HEIGHT: 72 IN | BODY MASS INDEX: 21.54 KG/M2 | TEMPERATURE: 97.4 F | WEIGHT: 159 LBS

## 2022-08-01 DIAGNOSIS — K22.4 ESOPHAGEAL SPASM: ICD-10-CM

## 2022-08-01 DIAGNOSIS — K22.2 SCHATZKI'S RING: ICD-10-CM

## 2022-08-01 DIAGNOSIS — R10.11 RUQ ABDOMINAL PAIN: ICD-10-CM

## 2022-08-01 DIAGNOSIS — N28.1 RENAL CYST: ICD-10-CM

## 2022-08-01 DIAGNOSIS — K76.89 LIVER CYST: ICD-10-CM

## 2022-08-01 DIAGNOSIS — K50.90 CROHN DISEASE: ICD-10-CM

## 2022-08-01 DIAGNOSIS — K21.9 GERD: ICD-10-CM

## 2022-08-01 DIAGNOSIS — R13.14 PHARYNGOESOPHAGEAL DYSPHAGIA: ICD-10-CM

## 2022-08-01 DIAGNOSIS — K80.20 GALLSTONES: ICD-10-CM

## 2022-08-01 DIAGNOSIS — K29.60 EROSIVE GASTRITIS: ICD-10-CM

## 2022-08-01 DIAGNOSIS — K59.01 SLOW TRANSIT CONSTIPATION: ICD-10-CM

## 2022-08-01 DIAGNOSIS — R10.13 EPIGASTRIC ABDOMINAL PAIN: ICD-10-CM

## 2022-08-01 PROCEDURE — 99213 OFFICE O/P EST LOW 20 MIN: CPT | Performed by: INTERNAL MEDICINE

## 2022-08-01 RX ORDER — SUCRALFATE 1 G/1
TAKE 1 TABLET BY MOUTH TWICE A DAY ON AN EMPTY STOMACH TABLET ORAL
Qty: 60 TABLET | Refills: 1 | Status: ACTIVE | COMMUNITY

## 2022-08-01 RX ORDER — CYANOCOBALAMIN (VITAMIN B-12) 100 MCG
1 TABLET TABLET ORAL ONCE A DAY
Refills: 0 | Status: ACTIVE | COMMUNITY
Start: 1900-01-01

## 2022-08-01 RX ORDER — FOLIC ACID 1 MG/1
1 TABLET TABLET ORAL ONCE A DAY
Status: ACTIVE | COMMUNITY

## 2022-08-01 RX ORDER — TAMSULOSIN HYDROCHLORIDE 0.4 MG/1
TAKE 1 CAPSULE (0.4 MG TOTAL) BY MOUTH DAILY AFTER BREAKFAST CAPSULE ORAL
Qty: 30 EACH | Refills: 0 | Status: ACTIVE | COMMUNITY

## 2022-08-01 RX ORDER — ASPIRIN 325 MG/1
1 TABLET TABLET, FILM COATED ORAL ONCE A DAY
Refills: 0 | Status: ACTIVE | COMMUNITY
Start: 1900-01-01

## 2022-08-01 RX ORDER — COLESTIPOL HYDROCHLORIDE 1 G/1
TAKE 1 TABLET BY MOUTH EVERY DAY TABLET, FILM COATED ORAL
Qty: 90 EACH | Refills: 0 | Status: ACTIVE | COMMUNITY

## 2022-08-01 RX ORDER — MEGESTROL ACETATE 40 MG/1
TAKE 1 TABLET BY MOUTH DAILY IN THE MORNING TABLET ORAL
Qty: 30 EACH | Refills: 4 | Status: ACTIVE | COMMUNITY

## 2022-08-01 RX ORDER — CHOLECALCIFEROL (VITAMIN D3) 50 MCG
1 TABLET CAPSULE ORAL ONCE A DAY
Refills: 0 | Status: ACTIVE | COMMUNITY
Start: 1900-01-01

## 2022-08-01 RX ORDER — HYOSCYAMINE SULFATE 0.38 MG/1
TAKE 1 TABLET BY MOUTH TWICE A DAY TABLET ORAL
Qty: 60 EACH | Refills: 0 | Status: ACTIVE | COMMUNITY

## 2022-08-01 RX ORDER — AMLODIPINE BESYLATE 10 MG/1
1 TABLET TABLET ORAL ONCE A DAY
Status: ACTIVE | COMMUNITY

## 2022-08-01 RX ORDER — FINASTERIDE 5 MG/1
TAKE 1 TABLET (5 MG TOTAL) BY MOUTH DAILY TABLET, FILM COATED ORAL
Qty: 30 EACH | Refills: 0 | Status: ACTIVE | COMMUNITY

## 2022-08-01 RX ORDER — AZATHIOPRINE 50 1/1
TAKE 1 TABLET (50 MG) BY ORAL ROUTE ONCE DAILY TABLET ORAL ONCE A DAY
Qty: 90 TABLET | Refills: 1 | Status: ACTIVE | COMMUNITY

## 2022-08-01 RX ORDER — ESOMEPRAZOLE MAGNESIUM 40 MG/1
1 CAPSULE CAPSULE, DELAYED RELEASE ORAL ONCE A DAY
Status: ACTIVE | COMMUNITY

## 2022-08-01 NOTE — HPI-TODAY'S VISIT:
Pt of Dr Huizar , seen today for location and expediency.  History of Crohn's in 1980s ongoing issues of anal discomfort with defecation without prior history of fissure fistula or abscess.  Intermittent incontinence.  No rectal bleeding.  Last colonoscopy June 2021 with pseudopolyps but otherwise normal colonoscopy without gross inflammation and without active inflammation or dysplasia on multiple random biopsies.  Prior history of perforation of colon 20 or so years ago during a colonoscopy requiring limited colon resection.  No other surgery for Crohn's disease.  History of dysphagia in the past with Schatzki's ring dilated in 2020 with good results.  No current dysphagia although having difficulty eating much solid food because of poor dentition.  Some heartburn on PPI and sucralfate.  Also taking hyoscyamine and occasionally Linzess for constipation.  MiraLAX and stool softeners have not been as effective for this.  Recent hospitalization for urinary retention caused by enlarged prostate with corresponding azotemia under care of urology and nephrology.  Additional history of gallstones, asymptomatic at present.  No active cardiac or respiratory problems.  On azathioprine 50 mg daily for many years with normal labs.

## 2022-08-01 NOTE — PHYSICAL EXAM HENT:
Head, normocephalic, atraumatic, Face, Face within normal limits, Ears, External ears within normal limits Missing/carious teeth

## 2022-08-04 ENCOUNTER — OFFICE VISIT (OUTPATIENT)
Dept: URBAN - METROPOLITAN AREA CLINIC 54 | Facility: CLINIC | Age: 77
End: 2022-08-04

## 2022-08-07 ENCOUNTER — ERX REFILL RESPONSE (OUTPATIENT)
Dept: URBAN - METROPOLITAN AREA CLINIC 82 | Facility: CLINIC | Age: 77
End: 2022-08-07

## 2022-08-07 RX ORDER — PANTOPRAZOLE SODIUM 40 MG/1
TAKE 1 TABLET BY MOUTH EVERY DAY TABLET, DELAYED RELEASE ORAL
Qty: 90 TABLET | Refills: 0 | OUTPATIENT

## 2022-08-09 ENCOUNTER — TELEPHONE ENCOUNTER (OUTPATIENT)
Dept: URBAN - METROPOLITAN AREA CLINIC 78 | Facility: CLINIC | Age: 77
End: 2022-08-09

## 2022-08-24 ENCOUNTER — OFFICE VISIT (OUTPATIENT)
Dept: URBAN - NONMETROPOLITAN AREA CLINIC 4 | Facility: CLINIC | Age: 77
End: 2022-08-24
Payer: MEDICARE

## 2022-08-24 ENCOUNTER — LAB OUTSIDE AN ENCOUNTER (OUTPATIENT)
Dept: URBAN - NONMETROPOLITAN AREA CLINIC 4 | Facility: CLINIC | Age: 77
End: 2022-08-24

## 2022-08-24 VITALS
DIASTOLIC BLOOD PRESSURE: 87 MMHG | HEART RATE: 73 BPM | HEIGHT: 72 IN | WEIGHT: 160.4 LBS | TEMPERATURE: 97.4 F | SYSTOLIC BLOOD PRESSURE: 144 MMHG | BODY MASS INDEX: 21.73 KG/M2

## 2022-08-24 DIAGNOSIS — K76.89 LIVER CYST: ICD-10-CM

## 2022-08-24 DIAGNOSIS — K50.80 CROHN'S COLITIS: ICD-10-CM

## 2022-08-24 DIAGNOSIS — R13.14 PHARYNGOESOPHAGEAL DYSPHAGIA: ICD-10-CM

## 2022-08-24 DIAGNOSIS — K22.4 ESOPHAGEAL SPASM: ICD-10-CM

## 2022-08-24 DIAGNOSIS — K59.01 SLOW TRANSIT CONSTIPATION: ICD-10-CM

## 2022-08-24 DIAGNOSIS — K22.2 SCHATZKI'S RING: ICD-10-CM

## 2022-08-24 DIAGNOSIS — N28.1 RENAL CYST: ICD-10-CM

## 2022-08-24 DIAGNOSIS — K21.9 GERD: ICD-10-CM

## 2022-08-24 PROCEDURE — 99214 OFFICE O/P EST MOD 30 MIN: CPT | Performed by: REGISTERED NURSE

## 2022-08-24 RX ORDER — COLESTIPOL HYDROCHLORIDE 1 G/1
TAKE 1 TABLET BY MOUTH EVERY DAY TABLET, FILM COATED ORAL
Qty: 90 EACH | Refills: 0 | Status: ACTIVE | COMMUNITY

## 2022-08-24 RX ORDER — NITROFURANTOIN (MONOHYDRATE/MACROCRYSTALS) 75; 25 MG/1; MG/1
1 CAPSULES CAPSULE ORAL TWICE A DAY
Status: ACTIVE | COMMUNITY

## 2022-08-24 RX ORDER — FINASTERIDE 5 MG/1
TAKE 1 TABLET (5 MG TOTAL) BY MOUTH DAILY TABLET, FILM COATED ORAL
Qty: 30 EACH | Refills: 0 | Status: ACTIVE | COMMUNITY

## 2022-08-24 RX ORDER — HYOSCYAMINE SULFATE 0.38 MG/1
TAKE 1 TABLET BY MOUTH TWICE A DAY TABLET ORAL
Qty: 60 EACH | Refills: 0 | Status: ACTIVE | COMMUNITY

## 2022-08-24 RX ORDER — PANTOPRAZOLE SODIUM 40 MG/1
TAKE 1 TABLET BY MOUTH EVERY DAY TABLET, DELAYED RELEASE ORAL
Qty: 90 TABLET | Refills: 0 | Status: ACTIVE | COMMUNITY

## 2022-08-24 RX ORDER — FOLIC ACID 1 MG/1
1 TABLET TABLET ORAL ONCE A DAY
Status: ACTIVE | COMMUNITY

## 2022-08-24 RX ORDER — CHOLECALCIFEROL (VITAMIN D3) 50 MCG
1 TABLET CAPSULE ORAL ONCE A DAY
Refills: 0 | Status: ACTIVE | COMMUNITY
Start: 1900-01-01

## 2022-08-24 RX ORDER — AZATHIOPRINE 50 1/1
TAKE 1 TABLET (50 MG) BY ORAL ROUTE ONCE DAILY TABLET ORAL ONCE A DAY
Qty: 90 TABLET | Refills: 1 | Status: ACTIVE | COMMUNITY

## 2022-08-24 RX ORDER — SODIUM, POTASSIUM,MAG SULFATES 17.5-3.13G
177ML SOLUTION, RECONSTITUTED, ORAL ORAL ONCE A DAY
Qty: 354 ML | Refills: 0 | OUTPATIENT
Start: 2022-08-24 | End: 2022-08-26

## 2022-08-24 RX ORDER — SUCRALFATE 1 G/1
TAKE 1 TABLET BY MOUTH TWICE A DAY ON AN EMPTY STOMACH TABLET ORAL
Qty: 60 TABLET | Refills: 1 | Status: ACTIVE | COMMUNITY

## 2022-08-24 RX ORDER — CYANOCOBALAMIN (VITAMIN B-12) 100 MCG
1 TABLET TABLET ORAL ONCE A DAY
Refills: 0 | Status: ACTIVE | COMMUNITY
Start: 1900-01-01

## 2022-08-24 RX ORDER — MEGESTROL ACETATE 40 MG/1
TAKE 1 TABLET BY MOUTH DAILY IN THE MORNING TABLET ORAL
Qty: 30 EACH | Refills: 4 | Status: ACTIVE | COMMUNITY

## 2022-08-24 RX ORDER — AMLODIPINE BESYLATE 10 MG/1
1 TABLET TABLET ORAL ONCE A DAY
Status: ACTIVE | COMMUNITY

## 2022-08-24 RX ORDER — ASPIRIN 325 MG/1
1 TABLET TABLET, FILM COATED ORAL ONCE A DAY
Refills: 0 | Status: ACTIVE | COMMUNITY
Start: 1900-01-01

## 2022-08-24 RX ORDER — TAMSULOSIN HYDROCHLORIDE 0.4 MG/1
TAKE 1 CAPSULE (0.4 MG TOTAL) BY MOUTH DAILY AFTER BREAKFAST CAPSULE ORAL
Qty: 30 EACH | Refills: 0 | Status: ACTIVE | COMMUNITY

## 2022-08-24 RX ORDER — ESOMEPRAZOLE MAGNESIUM 40 MG/1
1 CAPSULE CAPSULE, DELAYED RELEASE ORAL ONCE A DAY
Status: ACTIVE | COMMUNITY

## 2022-08-24 NOTE — HPI-TODAY'S VISIT:
Pt of Dr Huizar , seen today for location and expediency.  History of Crohn's in 1980s ongoing issues of anal discomfort with defecation without prior history of fissure fistula or abscess.  Intermittent incontinence.  No rectal bleeding.  Last colonoscopy June 2021 with pseudopolyps but otherwise normal colonoscopy without gross inflammation and without active inflammation or dysplasia on multiple random biopsies.  Prior history of perforation of colon 20 or so years ago during a colonoscopy requiring limited colon resection.  No other surgery for Crohn's disease.  History of dysphagia in the past with Schatzki's ring dilated in 2020 with good results.  No current dysphagia although having difficulty eating much solid food because of poor dentition.  Some heartburn on PPI and sucralfate.  Also taking hyoscyamine and occasionally Linzess for constipation.  MiraLAX and stool softeners have not been as effective for this.  Recent hospitalization for urinary retention caused by enlarged prostate with corresponding azotemia under care of urology and nephrology.  Additional history of gallstones, asymptomatic at present.  No active cardiac or respiratory problems.  On azathioprine 50 mg daily for many years with normal labs.  8/24/22: Pt RTC for f/u. Denies any further rectal pain. Staes he only has a BM with Linzess, which he takes qod. Feels an urge to have BM on days he does not take Linzess, but can not go. Denies bloody stools or abdominal pain. His dental implants came out and he is having to eat mostly liquids/soft foods. He reports intermittent chest pain and dysphagia. Has had cardiac workup in past with no ischemia. He takes PPI daily. He takes bASA daily.

## 2022-08-25 LAB — C-REACTIVE PROTEIN, QUANT: 6

## 2022-09-07 ENCOUNTER — OFFICE VISIT (OUTPATIENT)
Dept: URBAN - NONMETROPOLITAN AREA CLINIC 4 | Facility: CLINIC | Age: 77
End: 2022-09-07

## 2022-09-08 ENCOUNTER — OFFICE VISIT (OUTPATIENT)
Dept: URBAN - METROPOLITAN AREA SURGERY CENTER 14 | Facility: SURGERY CENTER | Age: 77
End: 2022-09-08

## 2022-09-12 ENCOUNTER — OFFICE VISIT (OUTPATIENT)
Dept: URBAN - METROPOLITAN AREA CLINIC 54 | Facility: CLINIC | Age: 77
End: 2022-09-12

## 2022-09-14 ENCOUNTER — OUT OF OFFICE VISIT (OUTPATIENT)
Dept: URBAN - NONMETROPOLITAN AREA MEDICAL CENTER 3 | Facility: MEDICAL CENTER | Age: 77
End: 2022-09-14
Payer: MEDICARE

## 2022-09-14 DIAGNOSIS — R93.3 ABN FINDINGS-GI TRACT: ICD-10-CM

## 2022-09-14 DIAGNOSIS — K50.80 CROHN'S COLITIS: ICD-10-CM

## 2022-09-14 DIAGNOSIS — R93.2 ABN FIND-BILIARY TRACT: ICD-10-CM

## 2022-09-14 PROCEDURE — 99223 1ST HOSP IP/OBS HIGH 75: CPT | Performed by: PHYSICIAN ASSISTANT

## 2022-09-14 PROCEDURE — G8427 DOCREV CUR MEDS BY ELIG CLIN: HCPCS | Performed by: PHYSICIAN ASSISTANT

## 2022-09-14 PROCEDURE — 99232 SBSQ HOSP IP/OBS MODERATE 35: CPT | Performed by: PHYSICIAN ASSISTANT

## 2022-09-21 ENCOUNTER — OFFICE VISIT (OUTPATIENT)
Dept: URBAN - METROPOLITAN AREA SURGERY CENTER 14 | Facility: SURGERY CENTER | Age: 77
End: 2022-09-21

## 2022-10-10 ENCOUNTER — TELEPHONE ENCOUNTER (OUTPATIENT)
Dept: URBAN - NONMETROPOLITAN AREA CLINIC 4 | Facility: CLINIC | Age: 77
End: 2022-10-10

## 2022-10-14 ENCOUNTER — OUT OF OFFICE VISIT (OUTPATIENT)
Dept: URBAN - NONMETROPOLITAN AREA MEDICAL CENTER 3 | Facility: MEDICAL CENTER | Age: 77
End: 2022-10-14
Payer: MEDICARE

## 2022-10-14 DIAGNOSIS — D62 ABLA (ACUTE BLOOD LOSS ANEMIA): ICD-10-CM

## 2022-10-14 DIAGNOSIS — K50.811 CROHN'S DISEASE OF BOTH SMALL AND LARGE INTESTINE WITH RECTAL BLEEDING: ICD-10-CM

## 2022-10-14 PROCEDURE — G8427 DOCREV CUR MEDS BY ELIG CLIN: HCPCS | Performed by: PHYSICIAN ASSISTANT

## 2022-10-14 PROCEDURE — 99204 OFFICE O/P NEW MOD 45 MIN: CPT | Performed by: PHYSICIAN ASSISTANT

## 2022-10-14 PROCEDURE — 99222 1ST HOSP IP/OBS MODERATE 55: CPT | Performed by: PHYSICIAN ASSISTANT

## 2022-10-15 ENCOUNTER — OUT OF OFFICE VISIT (OUTPATIENT)
Dept: URBAN - NONMETROPOLITAN AREA MEDICAL CENTER 3 | Facility: MEDICAL CENTER | Age: 77
End: 2022-10-15
Payer: MEDICARE

## 2022-10-15 DIAGNOSIS — D62 ABLA (ACUTE BLOOD LOSS ANEMIA): ICD-10-CM

## 2022-10-15 DIAGNOSIS — K50.811 CROHN'S DISEASE OF BOTH SMALL AND LARGE INTESTINE WITH RECTAL BLEEDING: ICD-10-CM

## 2022-10-15 PROCEDURE — 99214 OFFICE O/P EST MOD 30 MIN: CPT | Performed by: PHYSICIAN ASSISTANT

## 2022-10-17 ENCOUNTER — OFFICE VISIT (OUTPATIENT)
Dept: URBAN - NONMETROPOLITAN AREA CLINIC 4 | Facility: CLINIC | Age: 77
End: 2022-10-17
Payer: MEDICARE

## 2022-10-17 VITALS
WEIGHT: 161.2 LBS | TEMPERATURE: 97.3 F | BODY MASS INDEX: 21.83 KG/M2 | DIASTOLIC BLOOD PRESSURE: 77 MMHG | SYSTOLIC BLOOD PRESSURE: 143 MMHG | HEIGHT: 72 IN | HEART RATE: 85 BPM

## 2022-10-17 DIAGNOSIS — K62.89 RECTAL PAIN: ICD-10-CM

## 2022-10-17 DIAGNOSIS — K62.5 RECTAL BLEEDING: ICD-10-CM

## 2022-10-17 DIAGNOSIS — K21.9 GERD: ICD-10-CM

## 2022-10-17 DIAGNOSIS — N18.4 STAGE 4 CHRONIC KIDNEY DISEASE: ICD-10-CM

## 2022-10-17 DIAGNOSIS — R13.14 PHARYNGOESOPHAGEAL DYSPHAGIA: ICD-10-CM

## 2022-10-17 DIAGNOSIS — K22.2 SCHATZKI'S RING: ICD-10-CM

## 2022-10-17 DIAGNOSIS — N28.1 RENAL CYST: ICD-10-CM

## 2022-10-17 DIAGNOSIS — K59.09 CHANGE IN BOWEL MOVEMENTS INTERMITTENT CONSTIPATION. URGENCY IN THE MORNING.: ICD-10-CM

## 2022-10-17 DIAGNOSIS — I48.91 ATRIAL FIBRILLATION, UNSPECIFIED TYPE: ICD-10-CM

## 2022-10-17 DIAGNOSIS — K22.4 ESOPHAGEAL SPASM: ICD-10-CM

## 2022-10-17 DIAGNOSIS — K76.89 LIVER CYST: ICD-10-CM

## 2022-10-17 DIAGNOSIS — K50.80 CROHN'S COLITIS: ICD-10-CM

## 2022-10-17 PROBLEM — 49436004: Status: ACTIVE | Noted: 2022-10-17

## 2022-10-17 PROBLEM — 722223000 RENAL CYST: Status: ACTIVE | Noted: 2020-12-10

## 2022-10-17 PROBLEM — 266434009: Status: ACTIVE | Noted: 2020-12-10

## 2022-10-17 PROBLEM — 40739000: Status: ACTIVE | Noted: 2021-07-26

## 2022-10-17 PROBLEM — 85057007 LIVER CYST: Status: ACTIVE | Noted: 2020-09-10

## 2022-10-17 PROBLEM — 235623002: Status: ACTIVE | Noted: 2020-12-10

## 2022-10-17 PROBLEM — 35298007: Status: ACTIVE | Noted: 2020-09-10

## 2022-10-17 PROBLEM — 431857002: Status: ACTIVE | Noted: 2022-10-17

## 2022-10-17 PROCEDURE — 99214 OFFICE O/P EST MOD 30 MIN: CPT | Performed by: REGISTERED NURSE

## 2022-10-17 RX ORDER — MEGESTROL ACETATE 40 MG/1
TAKE 1 TABLET BY MOUTH DAILY IN THE MORNING TABLET ORAL
Qty: 30 EACH | Refills: 4 | Status: ACTIVE | COMMUNITY

## 2022-10-17 RX ORDER — AZATHIOPRINE 50 1/1
TAKE 1 TABLET (50 MG) BY ORAL ROUTE ONCE DAILY TABLET ORAL ONCE A DAY
Qty: 90 TABLET | Refills: 1 | Status: ACTIVE | COMMUNITY

## 2022-10-17 RX ORDER — CYANOCOBALAMIN (VITAMIN B-12) 100 MCG
1 TABLET TABLET ORAL ONCE A DAY
Refills: 0 | Status: ACTIVE | COMMUNITY
Start: 1900-01-01

## 2022-10-17 RX ORDER — SUCRALFATE 1 G/1
TAKE 1 TABLET BY MOUTH TWICE A DAY ON AN EMPTY STOMACH TABLET ORAL
Qty: 60 TABLET | Refills: 1 | Status: ACTIVE | COMMUNITY

## 2022-10-17 RX ORDER — ASPIRIN 325 MG/1
1 TABLET TABLET, FILM COATED ORAL ONCE A DAY
Refills: 0 | Status: ACTIVE | COMMUNITY
Start: 1900-01-01

## 2022-10-17 RX ORDER — PANTOPRAZOLE SODIUM 40 MG/1
TAKE 1 TABLET BY MOUTH EVERY DAY TABLET, DELAYED RELEASE ORAL
Qty: 90 TABLET | Refills: 0 | Status: ACTIVE | COMMUNITY

## 2022-10-17 RX ORDER — FINASTERIDE 5 MG/1
TAKE 1 TABLET (5 MG TOTAL) BY MOUTH DAILY TABLET, FILM COATED ORAL
Qty: 30 EACH | Refills: 0 | Status: ACTIVE | COMMUNITY

## 2022-10-17 RX ORDER — FOLIC ACID 1 MG/1
1 TABLET TABLET ORAL ONCE A DAY
Status: ACTIVE | COMMUNITY

## 2022-10-17 RX ORDER — COLESTIPOL HYDROCHLORIDE 1 G/1
TAKE 1 TABLET BY MOUTH EVERY DAY TABLET, FILM COATED ORAL
Qty: 90 EACH | Refills: 0 | Status: ACTIVE | COMMUNITY

## 2022-10-17 RX ORDER — ESOMEPRAZOLE MAGNESIUM 40 MG/1
1 CAPSULE CAPSULE, DELAYED RELEASE ORAL ONCE A DAY
Status: ACTIVE | COMMUNITY

## 2022-10-17 RX ORDER — TAMSULOSIN HYDROCHLORIDE 0.4 MG/1
TAKE 1 CAPSULE (0.4 MG TOTAL) BY MOUTH DAILY AFTER BREAKFAST CAPSULE ORAL
Qty: 30 EACH | Refills: 0 | Status: ACTIVE | COMMUNITY

## 2022-10-17 RX ORDER — AMLODIPINE BESYLATE 10 MG/1
1 TABLET TABLET ORAL ONCE A DAY
Status: ACTIVE | COMMUNITY

## 2022-10-17 RX ORDER — HYOSCYAMINE SULFATE 0.38 MG/1
TAKE 1 TABLET BY MOUTH TWICE A DAY TABLET ORAL
Qty: 60 EACH | Refills: 0 | Status: ACTIVE | COMMUNITY

## 2022-10-17 RX ORDER — CHOLECALCIFEROL (VITAMIN D3) 50 MCG
1 TABLET CAPSULE ORAL ONCE A DAY
Refills: 0 | Status: ACTIVE | COMMUNITY
Start: 1900-01-01

## 2022-10-17 RX ORDER — NITROFURANTOIN (MONOHYDRATE/MACROCRYSTALS) 75; 25 MG/1; MG/1
1 CAPSULES CAPSULE ORAL TWICE A DAY
Status: ACTIVE | COMMUNITY

## 2022-10-17 NOTE — HPI-TODAY'S VISIT:
Pt of Dr Huizar , seen today for location and expediency.  History of Crohn's in 1980s ongoing issues of anal discomfort with defecation without prior history of fissure fistula or abscess.  Intermittent incontinence.  No rectal bleeding.  Last colonoscopy June 2021 with pseudopolyps but otherwise normal colonoscopy without gross inflammation and without active inflammation or dysplasia on multiple random biopsies.  Prior history of perforation of colon 20 or so years ago during a colonoscopy requiring limited colon resection.  No other surgery for Crohn's disease.  History of dysphagia in the past with Schatzki's ring dilated in 2020 with good results.  No current dysphagia although having difficulty eating much solid food because of poor dentition.  Some heartburn on PPI and sucralfate.  Also taking hyoscyamine and occasionally Linzess for constipation.  MiraLAX and stool softeners have not been as effective for this.  Recent hospitalization for urinary retention caused by enlarged prostate with corresponding azotemia under care of urology and nephrology.  Additional history of gallstones, asymptomatic at present.  No active cardiac or respiratory problems.  On azathioprine 50 mg daily for many years with normal labs.  8/24/22: Pt RTC for f/u. Denies any further rectal pain. Staes he only has a BM with Linzess, which he takes qod. Feels an urge to have BM on days he does not take Linzess, but can not go. Denies bloody stools or abdominal pain. His dental implants came out and he is having to eat mostly liquids/soft foods. He reports intermittent chest pain and dysphagia. Has had cardiac workup in past with no ischemia. He takes PPI daily. He takes bASA daily.  10/17/22: Pt RTC for hospital f/u. He was hospitalized in September with A/CKD and was started on dialysis. He was also found to have Afb with RVR and was started on Eliquis. He was recently admitted to [facility] at few days ago with c/o rectal bleeding and anemia. States he had started Linzess PTA that likely caused diarrhea with associated low volume rectal bleeding. Since discharge, he denies any further rectal bleeding. He continues to c/o constipation and rectal pain. He is using hydrocortisone supp. He has not taken Linzess since last Sunday. He has rectal rockets at home, but has not used them yet.

## 2022-10-20 ENCOUNTER — OFFICE VISIT (OUTPATIENT)
Dept: URBAN - METROPOLITAN AREA SURGERY CENTER 14 | Facility: SURGERY CENTER | Age: 77
End: 2022-10-20

## 2022-10-21 ENCOUNTER — ERX REFILL RESPONSE (OUTPATIENT)
Dept: URBAN - METROPOLITAN AREA CLINIC 82 | Facility: CLINIC | Age: 77
End: 2022-10-21

## 2022-10-21 RX ORDER — AZATHIOPRINE 50 MG/1
TAKE 1 TABLET BY MOUTH EVERY DAY TABLET ORAL
Qty: 90 TABLET | Refills: 1 | OUTPATIENT

## 2022-10-21 RX ORDER — AZATHIOPRINE 50 1/1
TAKE 1 TABLET (50 MG) BY ORAL ROUTE ONCE DAILY TABLET ORAL ONCE A DAY
Qty: 90 TABLET | Refills: 1 | OUTPATIENT

## 2022-10-24 ENCOUNTER — TELEPHONE ENCOUNTER (OUTPATIENT)
Dept: URBAN - NONMETROPOLITAN AREA CLINIC 4 | Facility: CLINIC | Age: 77
End: 2022-10-24

## 2022-10-26 ENCOUNTER — OFFICE VISIT (OUTPATIENT)
Dept: URBAN - METROPOLITAN AREA MEDICAL CENTER 23 | Facility: MEDICAL CENTER | Age: 77
End: 2022-10-26
Payer: MEDICARE

## 2022-10-26 DIAGNOSIS — K92.1 ACUTE MELENA: ICD-10-CM

## 2022-10-26 DIAGNOSIS — K22.2 ACQUIRED ESOPHAGEAL RING: ICD-10-CM

## 2022-10-26 DIAGNOSIS — B37.81 CANDIDA: ICD-10-CM

## 2022-10-26 DIAGNOSIS — D12.0 ADENOMA OF CECUM: ICD-10-CM

## 2022-10-26 DIAGNOSIS — K50.80 CROHN'S COLITIS: ICD-10-CM

## 2022-10-26 PROCEDURE — 43239 EGD BIOPSY SINGLE/MULTIPLE: CPT | Performed by: INTERNAL MEDICINE

## 2022-10-26 PROCEDURE — 45380 COLONOSCOPY AND BIOPSY: CPT | Performed by: INTERNAL MEDICINE

## 2022-10-26 RX ORDER — PANTOPRAZOLE SODIUM 40 MG/1
TAKE 1 TABLET BY MOUTH EVERY DAY TABLET, DELAYED RELEASE ORAL
Qty: 90 TABLET | Refills: 0 | Status: ACTIVE | COMMUNITY

## 2022-10-26 RX ORDER — COLESTIPOL HYDROCHLORIDE 1 G/1
TAKE 1 TABLET BY MOUTH EVERY DAY TABLET, FILM COATED ORAL
Qty: 90 EACH | Refills: 0 | Status: ACTIVE | COMMUNITY

## 2022-10-26 RX ORDER — AZATHIOPRINE 50 MG/1
TAKE 1 TABLET BY MOUTH EVERY DAY TABLET ORAL
Qty: 90 TABLET | Refills: 1 | Status: ACTIVE | COMMUNITY

## 2022-10-26 RX ORDER — NITROFURANTOIN (MONOHYDRATE/MACROCRYSTALS) 75; 25 MG/1; MG/1
1 CAPSULES CAPSULE ORAL TWICE A DAY
Status: ACTIVE | COMMUNITY

## 2022-10-26 RX ORDER — FINASTERIDE 5 MG/1
TAKE 1 TABLET (5 MG TOTAL) BY MOUTH DAILY TABLET, FILM COATED ORAL
Qty: 30 EACH | Refills: 0 | Status: ACTIVE | COMMUNITY

## 2022-10-26 RX ORDER — SUCRALFATE 1 G/1
TAKE 1 TABLET BY MOUTH TWICE A DAY ON AN EMPTY STOMACH TABLET ORAL
Qty: 60 TABLET | Refills: 1 | Status: ACTIVE | COMMUNITY

## 2022-10-26 RX ORDER — FOLIC ACID 1 MG/1
1 TABLET TABLET ORAL ONCE A DAY
Status: ACTIVE | COMMUNITY

## 2022-10-26 RX ORDER — ESOMEPRAZOLE MAGNESIUM 40 MG/1
1 CAPSULE CAPSULE, DELAYED RELEASE ORAL ONCE A DAY
Status: ACTIVE | COMMUNITY

## 2022-10-26 RX ORDER — CYANOCOBALAMIN (VITAMIN B-12) 100 MCG
1 TABLET TABLET ORAL ONCE A DAY
Refills: 0 | Status: ACTIVE | COMMUNITY
Start: 1900-01-01

## 2022-10-26 RX ORDER — ASPIRIN 325 MG/1
1 TABLET TABLET, FILM COATED ORAL ONCE A DAY
Refills: 0 | Status: ACTIVE | COMMUNITY
Start: 1900-01-01

## 2022-10-26 RX ORDER — MEGESTROL ACETATE 40 MG/1
TAKE 1 TABLET BY MOUTH DAILY IN THE MORNING TABLET ORAL
Qty: 30 EACH | Refills: 4 | Status: ACTIVE | COMMUNITY

## 2022-10-26 RX ORDER — AMLODIPINE BESYLATE 10 MG/1
1 TABLET TABLET ORAL ONCE A DAY
Status: ACTIVE | COMMUNITY

## 2022-10-26 RX ORDER — TAMSULOSIN HYDROCHLORIDE 0.4 MG/1
TAKE 1 CAPSULE (0.4 MG TOTAL) BY MOUTH DAILY AFTER BREAKFAST CAPSULE ORAL
Qty: 30 EACH | Refills: 0 | Status: ACTIVE | COMMUNITY

## 2022-10-26 RX ORDER — CHOLECALCIFEROL (VITAMIN D3) 50 MCG
1 TABLET CAPSULE ORAL ONCE A DAY
Refills: 0 | Status: ACTIVE | COMMUNITY
Start: 1900-01-01

## 2022-10-26 RX ORDER — HYOSCYAMINE SULFATE 0.38 MG/1
TAKE 1 TABLET BY MOUTH TWICE A DAY TABLET ORAL
Qty: 60 EACH | Refills: 0 | Status: ACTIVE | COMMUNITY

## 2022-11-02 ENCOUNTER — OFFICE VISIT (OUTPATIENT)
Dept: URBAN - METROPOLITAN AREA CLINIC 54 | Facility: CLINIC | Age: 77
End: 2022-11-02

## 2022-11-02 ENCOUNTER — TELEPHONE ENCOUNTER (OUTPATIENT)
Dept: URBAN - NONMETROPOLITAN AREA CLINIC 4 | Facility: CLINIC | Age: 77
End: 2022-11-02

## 2022-11-02 ENCOUNTER — TELEPHONE ENCOUNTER (OUTPATIENT)
Dept: URBAN - METROPOLITAN AREA CLINIC 92 | Facility: CLINIC | Age: 77
End: 2022-11-02

## 2022-11-02 RX ORDER — PANTOPRAZOLE SODIUM 40 MG/1
TAKE 1 TABLET BY MOUTH EVERY DAY TABLET, DELAYED RELEASE ORAL
Qty: 90 TABLET | Refills: 0 | Status: ACTIVE | COMMUNITY

## 2022-11-02 RX ORDER — CHOLECALCIFEROL (VITAMIN D3) 50 MCG
1 TABLET CAPSULE ORAL ONCE A DAY
Refills: 0 | Status: ACTIVE | COMMUNITY
Start: 1900-01-01

## 2022-11-02 RX ORDER — ESOMEPRAZOLE MAGNESIUM 40 MG/1
1 CAPSULE CAPSULE, DELAYED RELEASE ORAL ONCE A DAY
Status: ACTIVE | COMMUNITY

## 2022-11-02 RX ORDER — SUCRALFATE 1 G/1
TAKE 1 TABLET BY MOUTH TWICE A DAY ON AN EMPTY STOMACH TABLET ORAL
Qty: 60 TABLET | Refills: 1 | COMMUNITY

## 2022-11-02 RX ORDER — CHOLECALCIFEROL (VITAMIN D3) 50 MCG
1 TABLET CAPSULE ORAL ONCE A DAY
Refills: 0 | COMMUNITY
Start: 1900-01-01

## 2022-11-02 RX ORDER — FLUCONAZOLE 100 MG/1
1 TABLET TABLET ORAL ONCE A DAY
Qty: 14 TABLET | OUTPATIENT
Start: 2022-11-02 | End: 2022-11-15

## 2022-11-02 RX ORDER — HYOSCYAMINE SULFATE 0.38 MG/1
TAKE 1 TABLET BY MOUTH TWICE A DAY TABLET ORAL
Qty: 60 EACH | Refills: 0 | COMMUNITY

## 2022-11-02 RX ORDER — FLUCONAZOLE 100 MG/1
1 TABLET TABLET ORAL ONCE A DAY
Qty: 14 TABLET | Status: ACTIVE | COMMUNITY
Start: 2022-11-02 | End: 2022-11-15

## 2022-11-02 RX ORDER — SUCRALFATE 1 G/1
TAKE 1 TABLET BY MOUTH TWICE A DAY ON AN EMPTY STOMACH TABLET ORAL
Qty: 60 TABLET | Refills: 1 | Status: ACTIVE | COMMUNITY

## 2022-11-02 RX ORDER — AMLODIPINE BESYLATE 10 MG/1
1 TABLET TABLET ORAL ONCE A DAY
Status: ACTIVE | COMMUNITY

## 2022-11-02 RX ORDER — HYOSCYAMINE SULFATE 0.38 MG/1
TAKE 1 TABLET BY MOUTH TWICE A DAY TABLET ORAL
Qty: 60 EACH | Refills: 0 | Status: ACTIVE | COMMUNITY

## 2022-11-02 RX ORDER — FINASTERIDE 5 MG/1
TAKE 1 TABLET (5 MG TOTAL) BY MOUTH DAILY TABLET, FILM COATED ORAL
Qty: 30 EACH | Refills: 0 | COMMUNITY

## 2022-11-02 RX ORDER — TAMSULOSIN HYDROCHLORIDE 0.4 MG/1
TAKE 1 CAPSULE (0.4 MG TOTAL) BY MOUTH DAILY AFTER BREAKFAST CAPSULE ORAL
Qty: 30 EACH | Refills: 0 | Status: ACTIVE | COMMUNITY

## 2022-11-02 RX ORDER — NYSTATIN 100000 [USP'U]/ML
4 ML SUSPENSION ORAL
Qty: 480 | OUTPATIENT
Start: 2022-11-07 | End: 2022-12-07

## 2022-11-02 RX ORDER — AZATHIOPRINE 50 MG/1
TAKE 1 TABLET BY MOUTH EVERY DAY TABLET ORAL
Qty: 90 TABLET | Refills: 1 | Status: ACTIVE | COMMUNITY

## 2022-11-02 RX ORDER — AZATHIOPRINE 50 MG/1
TAKE 1 TABLET BY MOUTH EVERY DAY TABLET ORAL
Qty: 90 TABLET | Refills: 1 | COMMUNITY

## 2022-11-02 RX ORDER — CYANOCOBALAMIN (VITAMIN B-12) 100 MCG
1 TABLET TABLET ORAL ONCE A DAY
Refills: 0 | Status: ACTIVE | COMMUNITY
Start: 1900-01-01

## 2022-11-02 RX ORDER — COLESTIPOL HYDROCHLORIDE 1 G/1
TAKE 1 TABLET BY MOUTH EVERY DAY TABLET, FILM COATED ORAL
Qty: 90 EACH | Refills: 0 | Status: ACTIVE | COMMUNITY

## 2022-11-02 RX ORDER — ESOMEPRAZOLE MAGNESIUM 40 MG/1
1 CAPSULE CAPSULE, DELAYED RELEASE ORAL ONCE A DAY
COMMUNITY

## 2022-11-02 RX ORDER — TAMSULOSIN HYDROCHLORIDE 0.4 MG/1
TAKE 1 CAPSULE (0.4 MG TOTAL) BY MOUTH DAILY AFTER BREAKFAST CAPSULE ORAL
Qty: 30 EACH | Refills: 0 | COMMUNITY

## 2022-11-02 RX ORDER — COLESTIPOL HYDROCHLORIDE 1 G/1
TAKE 1 TABLET BY MOUTH EVERY DAY TABLET, FILM COATED ORAL
Qty: 90 EACH | Refills: 0 | COMMUNITY

## 2022-11-02 RX ORDER — ASPIRIN 325 MG/1
1 TABLET TABLET, FILM COATED ORAL ONCE A DAY
Refills: 0 | COMMUNITY
Start: 1900-01-01

## 2022-11-02 RX ORDER — ASPIRIN 325 MG/1
1 TABLET TABLET, FILM COATED ORAL ONCE A DAY
Refills: 0 | Status: ACTIVE | COMMUNITY
Start: 1900-01-01

## 2022-11-02 RX ORDER — MEGESTROL ACETATE 40 MG/1
TAKE 1 TABLET BY MOUTH DAILY IN THE MORNING TABLET ORAL
Qty: 30 EACH | Refills: 4 | COMMUNITY

## 2022-11-02 RX ORDER — FINASTERIDE 5 MG/1
TAKE 1 TABLET (5 MG TOTAL) BY MOUTH DAILY TABLET, FILM COATED ORAL
Qty: 30 EACH | Refills: 0 | Status: ACTIVE | COMMUNITY

## 2022-11-02 RX ORDER — MEGESTROL ACETATE 40 MG/1
TAKE 1 TABLET BY MOUTH DAILY IN THE MORNING TABLET ORAL
Qty: 30 EACH | Refills: 4 | Status: ACTIVE | COMMUNITY

## 2022-11-02 RX ORDER — CYANOCOBALAMIN (VITAMIN B-12) 100 MCG
1 TABLET TABLET ORAL ONCE A DAY
Refills: 0 | COMMUNITY
Start: 1900-01-01

## 2022-11-02 RX ORDER — PANTOPRAZOLE SODIUM 40 MG/1
TAKE 1 TABLET BY MOUTH EVERY DAY TABLET, DELAYED RELEASE ORAL
Qty: 90 TABLET | Refills: 0 | COMMUNITY

## 2022-11-02 RX ORDER — FOLIC ACID 1 MG/1
1 TABLET TABLET ORAL ONCE A DAY
Status: ACTIVE | COMMUNITY

## 2022-11-02 RX ORDER — AMLODIPINE BESYLATE 10 MG/1
1 TABLET TABLET ORAL ONCE A DAY
COMMUNITY

## 2022-11-02 RX ORDER — FOLIC ACID 1 MG/1
1 TABLET TABLET ORAL ONCE A DAY
COMMUNITY

## 2022-11-02 RX ORDER — NITROFURANTOIN (MONOHYDRATE/MACROCRYSTALS) 75; 25 MG/1; MG/1
1 CAPSULES CAPSULE ORAL TWICE A DAY
Status: ACTIVE | COMMUNITY

## 2022-11-02 RX ORDER — NITROFURANTOIN (MONOHYDRATE/MACROCRYSTALS) 75; 25 MG/1; MG/1
1 CAPSULES CAPSULE ORAL TWICE A DAY
COMMUNITY

## 2022-11-08 ENCOUNTER — TELEPHONE ENCOUNTER (OUTPATIENT)
Dept: URBAN - NONMETROPOLITAN AREA CLINIC 4 | Facility: CLINIC | Age: 77
End: 2022-11-08

## 2022-11-08 RX ORDER — SODIUM, POTASSIUM,MAG SULFATES 17.5-3.13G
177ML SOLUTION, RECONSTITUTED, ORAL ORAL ONCE A DAY
Qty: 354 ML | Refills: 0 | OUTPATIENT
Start: 2022-11-08 | End: 2022-11-10

## 2022-11-14 ENCOUNTER — ERX REFILL RESPONSE (OUTPATIENT)
Dept: URBAN - METROPOLITAN AREA CLINIC 82 | Facility: CLINIC | Age: 77
End: 2022-11-14

## 2022-11-14 RX ORDER — PANTOPRAZOLE SODIUM 40 MG/1
TAKE 1 TABLET BY MOUTH EVERY DAY TABLET, DELAYED RELEASE ORAL
Qty: 90 TABLET | Refills: 0 | OUTPATIENT

## 2022-11-18 ENCOUNTER — OFFICE VISIT (OUTPATIENT)
Dept: URBAN - METROPOLITAN AREA MEDICAL CENTER 23 | Facility: MEDICAL CENTER | Age: 77
End: 2022-11-18

## 2022-12-02 ENCOUNTER — OFFICE VISIT (OUTPATIENT)
Dept: URBAN - METROPOLITAN AREA MEDICAL CENTER 23 | Facility: MEDICAL CENTER | Age: 77
End: 2022-12-02
Payer: MEDICARE

## 2022-12-02 DIAGNOSIS — D12.2 ADENOMA OF ASCENDING COLON: ICD-10-CM

## 2022-12-02 PROCEDURE — 45385 COLONOSCOPY W/LESION REMOVAL: CPT | Performed by: INTERNAL MEDICINE

## 2022-12-02 PROCEDURE — 45381 COLONOSCOPY SUBMUCOUS NJX: CPT | Performed by: INTERNAL MEDICINE

## 2022-12-05 ENCOUNTER — OUT OF OFFICE VISIT (OUTPATIENT)
Dept: URBAN - NONMETROPOLITAN AREA MEDICAL CENTER 3 | Facility: MEDICAL CENTER | Age: 77
End: 2022-12-05
Payer: MEDICARE

## 2022-12-05 DIAGNOSIS — N18.6 ANEMIA DUE TO CHRONIC KIDNEY DISEASE, ON CHRONIC DIALYSIS: ICD-10-CM

## 2022-12-05 DIAGNOSIS — K91.840 BLEEDING AS COMPLICATION OF PANCREATIC-BILIARY SPHINCTEROTOMY: ICD-10-CM

## 2022-12-05 DIAGNOSIS — K50.80 CROHN'S COLITIS: ICD-10-CM

## 2022-12-05 PROCEDURE — G8427 DOCREV CUR MEDS BY ELIG CLIN: HCPCS | Performed by: PHYSICIAN ASSISTANT

## 2022-12-05 PROCEDURE — 99222 1ST HOSP IP/OBS MODERATE 55: CPT | Performed by: PHYSICIAN ASSISTANT

## 2022-12-05 PROCEDURE — 99214 OFFICE O/P EST MOD 30 MIN: CPT | Performed by: PHYSICIAN ASSISTANT

## 2022-12-05 PROCEDURE — 99204 OFFICE O/P NEW MOD 45 MIN: CPT | Performed by: PHYSICIAN ASSISTANT

## 2022-12-05 PROCEDURE — 99232 SBSQ HOSP IP/OBS MODERATE 35: CPT | Performed by: PHYSICIAN ASSISTANT

## 2022-12-06 ENCOUNTER — OUT OF OFFICE VISIT (OUTPATIENT)
Dept: URBAN - NONMETROPOLITAN AREA MEDICAL CENTER 3 | Facility: MEDICAL CENTER | Age: 77
End: 2022-12-06
Payer: MEDICARE

## 2022-12-06 DIAGNOSIS — K91.840 BLEEDING AS COMPLICATION OF PANCREATIC-BILIARY SPHINCTEROTOMY: ICD-10-CM

## 2022-12-06 PROCEDURE — 45382 COLONOSCOPY W/CONTROL BLEED: CPT | Performed by: INTERNAL MEDICINE

## 2022-12-11 ENCOUNTER — OUT OF OFFICE VISIT (OUTPATIENT)
Dept: URBAN - NONMETROPOLITAN AREA MEDICAL CENTER 3 | Facility: MEDICAL CENTER | Age: 77
End: 2022-12-11
Payer: MEDICARE

## 2022-12-11 DIAGNOSIS — Z79.02 ANTIPLATELET OR ANTITHROMBOTIC LONG-TERM USE: ICD-10-CM

## 2022-12-11 DIAGNOSIS — K91.840 BLEEDING AS COMPLICATION OF PANCREATIC-BILIARY SPHINCTEROTOMY: ICD-10-CM

## 2022-12-11 DIAGNOSIS — Z79.01 ANTICOAGULANT LONG-TERM USE: ICD-10-CM

## 2022-12-11 PROCEDURE — G8427 DOCREV CUR MEDS BY ELIG CLIN: HCPCS | Performed by: INTERNAL MEDICINE

## 2022-12-11 PROCEDURE — 99222 1ST HOSP IP/OBS MODERATE 55: CPT | Performed by: INTERNAL MEDICINE

## 2022-12-12 ENCOUNTER — OUT OF OFFICE VISIT (OUTPATIENT)
Dept: URBAN - NONMETROPOLITAN AREA MEDICAL CENTER 3 | Facility: MEDICAL CENTER | Age: 77
End: 2022-12-12
Payer: MEDICARE

## 2022-12-12 DIAGNOSIS — K63.3 APHTHOUS ULCER OF COLON: ICD-10-CM

## 2022-12-12 DIAGNOSIS — K92.1 ACUTE MELENA: ICD-10-CM

## 2022-12-12 PROCEDURE — 45382 COLONOSCOPY W/CONTROL BLEED: CPT | Performed by: STUDENT IN AN ORGANIZED HEALTH CARE EDUCATION/TRAINING PROGRAM

## 2022-12-13 ENCOUNTER — OUT OF OFFICE VISIT (OUTPATIENT)
Dept: URBAN - NONMETROPOLITAN AREA MEDICAL CENTER 3 | Facility: MEDICAL CENTER | Age: 77
End: 2022-12-13
Payer: MEDICARE

## 2022-12-13 DIAGNOSIS — K92.2 ACUTE GASTROINTESTINAL BLEEDING: ICD-10-CM

## 2022-12-13 DIAGNOSIS — N18.6 ANEMIA DUE TO CHRONIC KIDNEY DISEASE, ON CHRONIC DIALYSIS: ICD-10-CM

## 2022-12-13 DIAGNOSIS — K63.3 APHTHOUS ULCER OF COLON: ICD-10-CM

## 2022-12-13 PROCEDURE — 99232 SBSQ HOSP IP/OBS MODERATE 35: CPT | Performed by: PHYSICIAN ASSISTANT

## 2023-03-13 ENCOUNTER — ERX REFILL RESPONSE (OUTPATIENT)
Dept: URBAN - METROPOLITAN AREA CLINIC 82 | Facility: CLINIC | Age: 78
End: 2023-03-13

## 2023-03-13 RX ORDER — PANTOPRAZOLE SODIUM 40 MG/1
1 TABLET TABLET, DELAYED RELEASE ORAL ONCE A DAY
Qty: 90 TABLET | Refills: 1 | OUTPATIENT

## 2023-03-13 RX ORDER — PANTOPRAZOLE SODIUM 40 MG/1
TAKE 1 TABLET BY MOUTH EVERY DAY TABLET, DELAYED RELEASE ORAL
Qty: 90 TABLET | Refills: 0 | OUTPATIENT

## 2023-04-15 ENCOUNTER — ERX REFILL RESPONSE (OUTPATIENT)
Dept: URBAN - METROPOLITAN AREA CLINIC 82 | Facility: CLINIC | Age: 78
End: 2023-04-15

## 2023-04-15 RX ORDER — AZATHIOPRINE 50 MG/1
TAKE 1 TABLET BY MOUTH EVERY DAY TABLET ORAL
Qty: 90 TABLET | Refills: 1 | OUTPATIENT

## 2023-04-15 RX ORDER — AZATHIOPRINE 50 MG/1
TAKE 1 TABLET BY MOUTH EVERY DAY TABLET ORAL
Qty: 90 TABLET | Refills: 0 | OUTPATIENT

## 2023-07-31 ENCOUNTER — ERX REFILL RESPONSE (OUTPATIENT)
Dept: URBAN - METROPOLITAN AREA CLINIC 82 | Facility: CLINIC | Age: 78
End: 2023-07-31

## 2023-07-31 RX ORDER — AZATHIOPRINE 50 MG/1
TAKE 1 TABLET BY MOUTH EVERY DAY TABLET ORAL
Qty: 90 TABLET | Refills: 0 | OUTPATIENT

## 2023-08-09 ENCOUNTER — LAB OUTSIDE AN ENCOUNTER (OUTPATIENT)
Dept: URBAN - METROPOLITAN AREA CLINIC 115 | Facility: CLINIC | Age: 78
End: 2023-08-09

## 2023-08-09 ENCOUNTER — OFFICE VISIT (OUTPATIENT)
Dept: URBAN - METROPOLITAN AREA CLINIC 115 | Facility: CLINIC | Age: 78
End: 2023-08-09
Payer: MEDICARE

## 2023-08-09 VITALS
TEMPERATURE: 96.4 F | SYSTOLIC BLOOD PRESSURE: 105 MMHG | DIASTOLIC BLOOD PRESSURE: 50 MMHG | WEIGHT: 163 LBS | BODY MASS INDEX: 22.08 KG/M2 | HEART RATE: 70 BPM | HEIGHT: 72 IN

## 2023-08-09 DIAGNOSIS — K50.90 CROHN DISEASE: ICD-10-CM

## 2023-08-09 DIAGNOSIS — K22.4 ESOPHAGEAL SPASM: ICD-10-CM

## 2023-08-09 DIAGNOSIS — K29.60 EROSIVE GASTRITIS: ICD-10-CM

## 2023-08-09 DIAGNOSIS — K22.2 SCHATZKI'S RING: ICD-10-CM

## 2023-08-09 DIAGNOSIS — D12.2 ADENOMATOUS POLYP OF ASCENDING COLON: ICD-10-CM

## 2023-08-09 DIAGNOSIS — K80.20 GALLSTONES: ICD-10-CM

## 2023-08-09 DIAGNOSIS — K59.01 SLOW TRANSIT CONSTIPATION: ICD-10-CM

## 2023-08-09 DIAGNOSIS — D53.1 MEGALOBLASTIC ANEMIA: ICD-10-CM

## 2023-08-09 PROCEDURE — 99215 OFFICE O/P EST HI 40 MIN: CPT | Performed by: INTERNAL MEDICINE

## 2023-08-09 RX ORDER — SUCRALFATE 1 G/1
TAKE 1 TABLET BY MOUTH TWICE A DAY ON AN EMPTY STOMACH TABLET ORAL
Qty: 60 TABLET | Refills: 1 | COMMUNITY

## 2023-08-09 RX ORDER — LINACLOTIDE 72 UG/1
1 CAPSULE AT LEAST 30 MINUTES BEFORE THE FIRST MEAL OF THE DAY ON AN EMPTY STOMACH CAPSULE, GELATIN COATED ORAL ONCE A DAY
Qty: 90 CAPSULE | Refills: 3

## 2023-08-09 RX ORDER — ASPIRIN 325 MG/1
1 TABLET TABLET, FILM COATED ORAL ONCE A DAY
Refills: 0 | COMMUNITY
Start: 1900-01-01

## 2023-08-09 RX ORDER — NITROFURANTOIN (MONOHYDRATE/MACROCRYSTALS) 75; 25 MG/1; MG/1
1 CAPSULES CAPSULE ORAL TWICE A DAY
COMMUNITY

## 2023-08-09 RX ORDER — ESOMEPRAZOLE MAGNESIUM 40 MG/1
1 CAPSULE CAPSULE, DELAYED RELEASE ORAL ONCE A DAY
Qty: 90 CAPSULE | Refills: 3 | OUTPATIENT
Start: 2023-08-09

## 2023-08-09 RX ORDER — MEGESTROL ACETATE 40 MG/1
TAKE 1 TABLET BY MOUTH DAILY IN THE MORNING TABLET ORAL
Qty: 30 EACH | Refills: 4 | COMMUNITY

## 2023-08-09 RX ORDER — FINASTERIDE 5 MG/1
TAKE 1 TABLET (5 MG TOTAL) BY MOUTH DAILY TABLET, FILM COATED ORAL
Qty: 30 EACH | Refills: 0 | Status: ACTIVE | COMMUNITY

## 2023-08-09 RX ORDER — AZATHIOPRINE 50 MG/1
TAKE 1 TABLET BY MOUTH EVERY DAY TABLET ORAL
Qty: 90 TABLET | Refills: 0 | Status: ACTIVE | COMMUNITY

## 2023-08-09 RX ORDER — AZATHIOPRINE 50 MG/1
TAKE 1 TABLET BY MOUTH EVERY DAY TABLET ORAL
Qty: 90 TABLET | Refills: 3

## 2023-08-09 RX ORDER — FOLIC ACID 1 MG/1
1 TABLET TABLET ORAL ONCE A DAY
COMMUNITY

## 2023-08-09 RX ORDER — AMLODIPINE BESYLATE 10 MG/1
1 TABLET TABLET ORAL ONCE A DAY
Status: ACTIVE | COMMUNITY

## 2023-08-09 RX ORDER — POLYETHYLENE GLYCOL 3350 17 G/DOSE
AS DIRECTED PRIOR TO COLONSOCOPY POWDER (GRAM) ORAL ONCE A DAY
Qty: 238  GRAM | Refills: 0 | OUTPATIENT
Start: 2023-08-09 | End: 2023-08-10

## 2023-08-09 RX ORDER — COLESTIPOL HYDROCHLORIDE 1 G/1
TAKE 1 TABLET BY MOUTH EVERY DAY TABLET, FILM COATED ORAL
Qty: 90 EACH | Refills: 0 | Status: ACTIVE | COMMUNITY

## 2023-08-09 RX ORDER — TAMSULOSIN HYDROCHLORIDE 0.4 MG/1
TAKE 1 CAPSULE (0.4 MG TOTAL) BY MOUTH DAILY AFTER BREAKFAST CAPSULE ORAL
Qty: 30 EACH | Refills: 0 | Status: ACTIVE | COMMUNITY

## 2023-08-09 RX ORDER — ESOMEPRAZOLE MAGNESIUM 40 MG/1
1 CAPSULE CAPSULE, DELAYED RELEASE ORAL ONCE A DAY
COMMUNITY

## 2023-08-09 RX ORDER — PANTOPRAZOLE SODIUM 40 MG/1
1 TABLET TABLET, DELAYED RELEASE ORAL ONCE A DAY
Qty: 90 TABLET | Refills: 1 | Status: ACTIVE | COMMUNITY

## 2023-08-09 RX ORDER — CYANOCOBALAMIN (VITAMIN B-12) 100 MCG
1 TABLET TABLET ORAL ONCE A DAY
Refills: 0 | COMMUNITY
Start: 1900-01-01

## 2023-08-09 RX ORDER — LINACLOTIDE 72 UG/1
1 CAPSULE AT LEAST 30 MINUTES BEFORE THE FIRST MEAL OF THE DAY ON AN EMPTY STOMACH CAPSULE, GELATIN COATED ORAL ONCE A DAY
Status: ACTIVE | COMMUNITY

## 2023-08-09 RX ORDER — CHOLECALCIFEROL (VITAMIN D3) 50 MCG
1 TABLET CAPSULE ORAL ONCE A DAY
Refills: 0 | COMMUNITY
Start: 1900-01-01

## 2023-08-09 RX ORDER — HYOSCYAMINE SULFATE 0.38 MG/1
TAKE 1 TABLET BY MOUTH TWICE A DAY TABLET ORAL
Qty: 60 EACH | Refills: 0 | COMMUNITY

## 2023-08-09 NOTE — HPI-TODAY'S VISIT:
Mr. Butch Aquino is a 77-year-old male with a history of Crohn's disease on Imuran for years came in today along with his brother for evaluation of having chest spasms as well as occasional abdominal bloating and cramping.  Patient has had history coronary artery disease and has been extensively closely followed by the cardiologist and had recent monitoring done which was negative stress test and echocardiogram was unremarkable.  Patient stated he has chronic kidney disease has progressed and hence he is on hemodialysis via PermCath for the past few 6 months at least.  Patient stated he has had a colonoscopy while he was hospitalized for anemia as well as.  Advancing worsening of renal disease.  At that time underwent colonoscopy which revealed the polyp which was villous adenoma in the ileocecal valve and extending into the ascending colon.  He underwent another colonoscopy by advanced endoscopist to the EMR following that patient developed post polypectomy bleed significantly and he had another repeat upper colonoscopy with an Endo Clip placement at the polypectomy site.  Polyp was consistent with a flat tubulovillous adenoma with high-grade dysplasia.  Patient also noted to have a history of chronic Crohn's disease and also has multiple pseudopolyps.  No active Crohn's disease was noted.  Patient was advised to follow-up however had not see any follow-ups ever since last December.  Patient stated he has been busy with the nephrology appointments as well as with the dialysis.  He also reports having some chest spasms and chest tightness however recent cardiac workup was again negative.  Patient denies any unintentional weight loss however he did lose some weight since all his kidney disease symptoms have got worse.  Today he is accompanied by his brother WHO IS HIS CAREGIVER AS WELL.  HE ALSO HAS HISTORY OF GALLSTONES BUT DENIES ANY RIGHT UPPER QUADRANT PAIN.  He reports abdominal bloating and distention and unintentional weight loss.  Patient is on colestipol every day and not sure why he takes that.  He reports having constipation and constipation is true being treated with the stool softeners as well as Linzess 145.  Patient reports that Linzess 145 mcg caused symptoms of diarrhea.  Patient denies any rectal bleeding.

## 2023-08-10 LAB
A/G RATIO: 2
ALBUMIN: 4.6
ALKALINE PHOSPHATASE: 50
ALT (SGPT): 4
AST (SGOT): 8
BASO (ABSOLUTE): 0
BASOS: 1
BILIRUBIN, TOTAL: 0.3
BUN/CREATININE RATIO: 6
BUN: 30
CALCIUM: 9.1
CARBON DIOXIDE, TOTAL: 28
CHLORIDE: 98
CREATININE: 4.91
EGFR: 11
EOS (ABSOLUTE): 0.2
EOS: 3
GLOBULIN, TOTAL: 2.3
GLUCOSE: 91
HEMATOCRIT: 35.9
HEMATOLOGY COMMENTS:: (no result)
HEMOGLOBIN: 11.7
IMMATURE CELLS: (no result)
IMMATURE GRANS (ABS): 0
IMMATURE GRANULOCYTES: 0
LYMPHS (ABSOLUTE): 0.8
LYMPHS: 15
MCH: 33.5
MCHC: 32.6
MCV: 103
MONOCYTES(ABSOLUTE): 0.6
MONOCYTES: 11
NEUTROPHILS (ABSOLUTE): 3.8
NEUTROPHILS: 70
NRBC: (no result)
PLATELETS: 215
POTASSIUM: 4
PROTEIN, TOTAL: 6.9
RBC: 3.49
RDW: 14.2
SODIUM: 141
WBC: 5.4

## 2023-09-05 ENCOUNTER — TELEPHONE ENCOUNTER (OUTPATIENT)
Dept: URBAN - METROPOLITAN AREA CLINIC 115 | Facility: CLINIC | Age: 78
End: 2023-09-05

## 2023-09-05 RX ORDER — AMOXICILLIN AND CLAVULANATE POTASSIUM 500; 125 MG/1; 1/1
1 TABLET TABLET, FILM COATED ORAL
Qty: 14 TABLET | Refills: 0 | OUTPATIENT
Start: 2023-09-05 | End: 2023-09-12

## 2023-09-07 ENCOUNTER — OFFICE VISIT (OUTPATIENT)
Dept: URBAN - METROPOLITAN AREA CLINIC 82 | Facility: CLINIC | Age: 78
End: 2023-09-07

## 2023-09-07 RX ORDER — LINACLOTIDE 72 UG/1
1 CAPSULE AT LEAST 30 MINUTES BEFORE THE FIRST MEAL OF THE DAY ON AN EMPTY STOMACH CAPSULE, GELATIN COATED ORAL ONCE A DAY
Qty: 90 CAPSULE | Refills: 3 | Status: ACTIVE | COMMUNITY

## 2023-09-07 RX ORDER — AMLODIPINE BESYLATE 10 MG/1
1 TABLET TABLET ORAL ONCE A DAY
Status: ACTIVE | COMMUNITY

## 2023-09-07 RX ORDER — ESOMEPRAZOLE MAGNESIUM 40 MG/1
1 CAPSULE CAPSULE, DELAYED RELEASE ORAL ONCE A DAY
Qty: 90 CAPSULE | Refills: 3 | Status: ACTIVE | COMMUNITY
Start: 2023-08-09

## 2023-09-07 RX ORDER — ESOMEPRAZOLE MAGNESIUM 40 MG/1
1 CAPSULE CAPSULE, DELAYED RELEASE ORAL ONCE A DAY
COMMUNITY

## 2023-09-07 RX ORDER — AMOXICILLIN AND CLAVULANATE POTASSIUM 500; 125 MG/1; 1/1
1 TABLET TABLET, FILM COATED ORAL
Qty: 14 TABLET | Refills: 0 | Status: ACTIVE | COMMUNITY
Start: 2023-09-05 | End: 2023-09-12

## 2023-09-07 RX ORDER — AZATHIOPRINE 50 MG/1
TAKE 1 TABLET BY MOUTH EVERY DAY TABLET ORAL
Qty: 90 TABLET | Refills: 3 | Status: ACTIVE | COMMUNITY

## 2023-09-07 RX ORDER — PANTOPRAZOLE SODIUM 40 MG/1
1 TABLET TABLET, DELAYED RELEASE ORAL ONCE A DAY
Qty: 90 TABLET | Refills: 1 | Status: ACTIVE | COMMUNITY

## 2023-09-07 RX ORDER — ASPIRIN 325 MG/1
1 TABLET TABLET, FILM COATED ORAL ONCE A DAY
Refills: 0 | COMMUNITY
Start: 1900-01-01

## 2023-09-07 RX ORDER — CYANOCOBALAMIN (VITAMIN B-12) 100 MCG
1 TABLET TABLET ORAL ONCE A DAY
Refills: 0 | COMMUNITY
Start: 1900-01-01

## 2023-09-07 RX ORDER — HYOSCYAMINE SULFATE 0.38 MG/1
TAKE 1 TABLET BY MOUTH TWICE A DAY TABLET ORAL
Qty: 60 EACH | Refills: 0 | COMMUNITY

## 2023-09-07 RX ORDER — CHOLECALCIFEROL (VITAMIN D3) 50 MCG
1 TABLET CAPSULE ORAL ONCE A DAY
Refills: 0 | COMMUNITY
Start: 1900-01-01

## 2023-09-07 RX ORDER — NITROFURANTOIN (MONOHYDRATE/MACROCRYSTALS) 75; 25 MG/1; MG/1
1 CAPSULES CAPSULE ORAL TWICE A DAY
COMMUNITY

## 2023-09-07 RX ORDER — SUCRALFATE 1 G/1
TAKE 1 TABLET BY MOUTH TWICE A DAY ON AN EMPTY STOMACH TABLET ORAL
Qty: 60 TABLET | Refills: 1 | COMMUNITY

## 2023-09-07 RX ORDER — FOLIC ACID 1 MG/1
1 TABLET TABLET ORAL ONCE A DAY
COMMUNITY

## 2023-09-07 RX ORDER — MEGESTROL ACETATE 40 MG/1
TAKE 1 TABLET BY MOUTH DAILY IN THE MORNING TABLET ORAL
Qty: 30 EACH | Refills: 4 | COMMUNITY

## 2023-09-07 RX ORDER — COLESTIPOL HYDROCHLORIDE 1 G/1
TAKE 1 TABLET BY MOUTH EVERY DAY TABLET, FILM COATED ORAL
Qty: 90 EACH | Refills: 0 | Status: ACTIVE | COMMUNITY

## 2023-09-07 RX ORDER — TAMSULOSIN HYDROCHLORIDE 0.4 MG/1
TAKE 1 CAPSULE (0.4 MG TOTAL) BY MOUTH DAILY AFTER BREAKFAST CAPSULE ORAL
Qty: 30 EACH | Refills: 0 | Status: ACTIVE | COMMUNITY

## 2023-09-07 RX ORDER — FINASTERIDE 5 MG/1
TAKE 1 TABLET (5 MG TOTAL) BY MOUTH DAILY TABLET, FILM COATED ORAL
Qty: 30 EACH | Refills: 0 | Status: ACTIVE | COMMUNITY

## 2023-09-27 ENCOUNTER — OFFICE VISIT (OUTPATIENT)
Dept: URBAN - METROPOLITAN AREA CLINIC 115 | Facility: CLINIC | Age: 78
End: 2023-09-27
Payer: MEDICARE

## 2023-09-27 VITALS
BODY MASS INDEX: 20.72 KG/M2 | TEMPERATURE: 97 F | HEART RATE: 71 BPM | HEIGHT: 72 IN | DIASTOLIC BLOOD PRESSURE: 66 MMHG | WEIGHT: 153 LBS | SYSTOLIC BLOOD PRESSURE: 109 MMHG

## 2023-09-27 DIAGNOSIS — D53.1 MEGALOBLASTIC ANEMIA: ICD-10-CM

## 2023-09-27 DIAGNOSIS — K22.2 SCHATZKI'S RING: ICD-10-CM

## 2023-09-27 DIAGNOSIS — D12.2 ADENOMATOUS POLYP OF ASCENDING COLON: ICD-10-CM

## 2023-09-27 DIAGNOSIS — K80.20 GALLSTONES: ICD-10-CM

## 2023-09-27 DIAGNOSIS — K59.01 SLOW TRANSIT CONSTIPATION: ICD-10-CM

## 2023-09-27 DIAGNOSIS — K22.4 ESOPHAGEAL SPASM: ICD-10-CM

## 2023-09-27 DIAGNOSIS — K50.90 CROHN DISEASE: ICD-10-CM

## 2023-09-27 DIAGNOSIS — K29.60 EROSIVE GASTRITIS: ICD-10-CM

## 2023-09-27 PROCEDURE — 99214 OFFICE O/P EST MOD 30 MIN: CPT | Performed by: INTERNAL MEDICINE

## 2023-09-27 RX ORDER — CHOLECALCIFEROL (VITAMIN D3) 50 MCG
1 TABLET CAPSULE ORAL ONCE A DAY
Refills: 0 | COMMUNITY
Start: 1900-01-01

## 2023-09-27 RX ORDER — CYANOCOBALAMIN (VITAMIN B-12) 100 MCG
1 TABLET TABLET ORAL ONCE A DAY
Refills: 0 | COMMUNITY
Start: 1900-01-01

## 2023-09-27 RX ORDER — SUCRALFATE 1 G/1
TAKE 1 TABLET BY MOUTH TWICE A DAY ON AN EMPTY STOMACH TABLET ORAL
Qty: 60 TABLET | Refills: 1 | COMMUNITY

## 2023-09-27 RX ORDER — TAMSULOSIN HYDROCHLORIDE 0.4 MG/1
TAKE 1 CAPSULE (0.4 MG TOTAL) BY MOUTH DAILY AFTER BREAKFAST CAPSULE ORAL
Qty: 30 EACH | Refills: 0 | Status: ACTIVE | COMMUNITY

## 2023-09-27 RX ORDER — AMLODIPINE BESYLATE 10 MG/1
1 TABLET TABLET ORAL ONCE A DAY
Status: ACTIVE | COMMUNITY

## 2023-09-27 RX ORDER — MEGESTROL ACETATE 40 MG/1
TAKE 1 TABLET BY MOUTH DAILY IN THE MORNING TABLET ORAL
Qty: 30 EACH | Refills: 4 | COMMUNITY

## 2023-09-27 RX ORDER — NITROFURANTOIN (MONOHYDRATE/MACROCRYSTALS) 75; 25 MG/1; MG/1
1 CAPSULES CAPSULE ORAL TWICE A DAY
COMMUNITY

## 2023-09-27 RX ORDER — AZATHIOPRINE 50 MG/1
TAKE 1 TABLET BY MOUTH EVERY DAY TABLET ORAL
Qty: 90 TABLET | Refills: 3

## 2023-09-27 RX ORDER — ESOMEPRAZOLE MAGNESIUM 40 MG/1
1 CAPSULE CAPSULE, DELAYED RELEASE ORAL ONCE A DAY
COMMUNITY

## 2023-09-27 RX ORDER — ESOMEPRAZOLE MAGNESIUM 40 MG/1
1 CAPSULE CAPSULE, DELAYED RELEASE ORAL ONCE A DAY
Qty: 90 CAPSULE | Refills: 3 | Status: ACTIVE | COMMUNITY
Start: 2023-08-09

## 2023-09-27 RX ORDER — AZATHIOPRINE 50 MG/1
TAKE 1 TABLET BY MOUTH EVERY DAY TABLET ORAL
Qty: 90 TABLET | Refills: 3 | Status: ACTIVE | COMMUNITY

## 2023-09-27 RX ORDER — ASPIRIN 325 MG/1
1 TABLET TABLET, FILM COATED ORAL ONCE A DAY
Refills: 0 | COMMUNITY
Start: 1900-01-01

## 2023-09-27 RX ORDER — FINASTERIDE 5 MG/1
TAKE 1 TABLET (5 MG TOTAL) BY MOUTH DAILY TABLET, FILM COATED ORAL
Qty: 30 EACH | Refills: 0 | Status: ACTIVE | COMMUNITY

## 2023-09-27 RX ORDER — COLESTIPOL HYDROCHLORIDE 1 G/1
TAKE 1 TABLET BY MOUTH EVERY DAY TABLET, FILM COATED ORAL
Qty: 90 EACH | Refills: 0 | Status: ACTIVE | COMMUNITY

## 2023-09-27 RX ORDER — BACLOFEN 5 MG/1
1 TABLET WITH FOOD OR MILK TABLET ORAL
Qty: 30 TABLET | Refills: 1 | OUTPATIENT
Start: 2023-09-27 | End: 2023-11-25

## 2023-09-27 RX ORDER — LINACLOTIDE 72 UG/1
1 CAPSULE AT LEAST 30 MINUTES BEFORE THE FIRST MEAL OF THE DAY ON AN EMPTY STOMACH CAPSULE, GELATIN COATED ORAL ONCE A DAY
Qty: 90 CAPSULE | Refills: 3

## 2023-09-27 RX ORDER — ESOMEPRAZOLE MAGNESIUM 40 MG/1
1 CAPSULE CAPSULE, DELAYED RELEASE ORAL ONCE A DAY
Qty: 90 CAPSULE | Refills: 3 | OUTPATIENT

## 2023-09-27 RX ORDER — PANTOPRAZOLE SODIUM 40 MG/1
1 TABLET TABLET, DELAYED RELEASE ORAL ONCE A DAY
Qty: 90 TABLET | Refills: 1 | Status: ACTIVE | COMMUNITY

## 2023-09-27 RX ORDER — FOLIC ACID 1 MG/1
1 TABLET TABLET ORAL ONCE A DAY
COMMUNITY

## 2023-09-27 RX ORDER — LINACLOTIDE 72 UG/1
1 CAPSULE AT LEAST 30 MINUTES BEFORE THE FIRST MEAL OF THE DAY ON AN EMPTY STOMACH CAPSULE, GELATIN COATED ORAL ONCE A DAY
Qty: 90 CAPSULE | Refills: 3 | Status: ACTIVE | COMMUNITY

## 2023-09-27 RX ORDER — HYOSCYAMINE SULFATE 0.38 MG/1
TAKE 1 TABLET BY MOUTH TWICE A DAY TABLET ORAL
Qty: 60 EACH | Refills: 0 | COMMUNITY

## 2023-09-27 NOTE — EXAM-PHYSICAL EXAM
AV fistula  perm cath rectal exam : no jadiel anal skin break down, poor sphinter tone and small hemorrhoids

## 2023-09-27 NOTE — HPI-TODAY'S VISIT:
Mr. Butch Aquino is a 77-year-old male with a history of Crohn's disease on Imuran for years came in today along with his brother for evaluation of having chest spasms as well as occasional abdominal bloating and cramping.  Patient has had history coronary artery disease and has been extensively closely followed by the cardiologist and had recent monitoring done which was negative stress test and echocardiogram was unremarkable.  Patient stated he has chronic kidney disease has progressed and hence he is on hemodialysis via PermCath for the past few 6 months at least.  Patient stated he has had a colonoscopy while he was hospitalized for anemia as well as.  Advancing worsening of renal disease.  At that time underwent colonoscopy which revealed the polyp which was villous adenoma in the ileocecal valve and extending into the ascending colon.  He underwent another colonoscopy by advanced endoscopist to the EMR following that patient developed post polypectomy bleed significantly and he had another repeat upper colonoscopy with an Endo Clip placement at the polypectomy site.  Polyp was consistent with a flat tubulovillous adenoma with high-grade dysplasia.  Patient also noted to have a history of chronic Crohn's disease and also has multiple pseudopolyps.  No active Crohn's disease was noted.  Patient was advised to follow-up however had not see any follow-ups ever since last December.  Patient stated he has been busy with the nephrology appointments as well as with the dialysis.  He also reports having some chest spasms and chest tightness however recent cardiac workup was again negative.  Patient denies any unintentional weight loss however he did lose some weight since all his kidney disease symptoms have got worse.  Today he is accompanied by his brother WHO IS HIS CAREGIVER AS WELL.  HE ALSO HAS HISTORY OF GALLSTONES BUT DENIES ANY RIGHT UPPER QUADRANT PAIN.  He reports abdominal bloating and distention and unintentional weight loss.  Patient is on colestipol every day and not sure why he takes that.  He reports having constipation and constipation is true being treated with the stool softeners as well as Linzess 145.  Patient reports that Linzess 145 mcg caused symptoms of diarrhea.  Patient denies any rectal bleeding.  9/27/23; Mrs. Aquino was seen today accompanied by his brother for follow-up after recent hospitalization at [facility] for severe diarrhea at that time he was diagnosed with Campylobacter and E. coli diarrhea gastroenteritis and he was treated with antibiotics.  During that episode he was noted to have acute on chronic renal insufficiency he is on dialysis for end-stage renal disease.  Patient reports fatigue and tiredness which is improving.  Some weight loss which is he is trying to gain weight.  He reports having intermittent chest spasms.  Cardiac work-up again was negative.  Patient also reports having anal spasms at random.  Patient denies any vomiting recently.  Since he has been discharged home he is not has not started back on his azathioprine esomeprazole.  He is scheduled to have a surveillance colonoscopy for Crohn's as well as DALM lesion.

## 2023-10-02 ENCOUNTER — ERX REFILL RESPONSE (OUTPATIENT)
Dept: URBAN - METROPOLITAN AREA CLINIC 115 | Facility: CLINIC | Age: 78
End: 2023-10-02

## 2023-10-02 RX ORDER — BACLOFEN 5 MG/1
TAKE 1 TABLET BY MOUTH EVERY DAY WITH FOOD/MILK AS NEEDED TABLET ORAL
Qty: 90 TABLET | Refills: 1 | OUTPATIENT

## 2023-10-02 RX ORDER — BACLOFEN 5 MG/1
1 TABLET WITH FOOD OR MILK TABLET ORAL
Qty: 30 TABLET | Refills: 1 | OUTPATIENT

## 2023-10-30 ENCOUNTER — OFFICE VISIT (OUTPATIENT)
Dept: URBAN - METROPOLITAN AREA MEDICAL CENTER 31 | Facility: MEDICAL CENTER | Age: 78
End: 2023-10-30
Payer: MEDICARE

## 2023-10-30 DIAGNOSIS — D12.2 ADENOMA OF ASCENDING COLON: ICD-10-CM

## 2023-10-30 DIAGNOSIS — K22.2 ACQUIRED ESOPHAGEAL RING: ICD-10-CM

## 2023-10-30 DIAGNOSIS — K50.10 CC (CROHN'S COLITIS): ICD-10-CM

## 2023-10-30 PROCEDURE — 45380 COLONOSCOPY AND BIOPSY: CPT | Performed by: INTERNAL MEDICINE

## 2023-10-30 PROCEDURE — 43249 ESOPH EGD DILATION <30 MM: CPT | Performed by: INTERNAL MEDICINE

## 2023-10-30 PROCEDURE — 45385 COLONOSCOPY W/LESION REMOVAL: CPT | Performed by: INTERNAL MEDICINE

## 2023-11-17 ENCOUNTER — OFFICE VISIT (OUTPATIENT)
Dept: URBAN - METROPOLITAN AREA CLINIC 115 | Facility: CLINIC | Age: 78
End: 2023-11-17

## 2024-01-10 ENCOUNTER — OFFICE VISIT (OUTPATIENT)
Dept: URBAN - METROPOLITAN AREA CLINIC 115 | Facility: CLINIC | Age: 79
End: 2024-01-10
Payer: MEDICARE

## 2024-01-10 ENCOUNTER — LAB OUTSIDE AN ENCOUNTER (OUTPATIENT)
Dept: URBAN - METROPOLITAN AREA CLINIC 115 | Facility: CLINIC | Age: 79
End: 2024-01-10

## 2024-01-10 VITALS
SYSTOLIC BLOOD PRESSURE: 111 MMHG | WEIGHT: 160.6 LBS | HEART RATE: 91 BPM | TEMPERATURE: 97.7 F | BODY MASS INDEX: 21.75 KG/M2 | HEIGHT: 72 IN | DIASTOLIC BLOOD PRESSURE: 68 MMHG

## 2024-01-10 DIAGNOSIS — K64.8 HEMORRHOIDS, INTERNAL: ICD-10-CM

## 2024-01-10 DIAGNOSIS — R13.14 PHARYNGOESOPHAGEAL DYSPHAGIA: ICD-10-CM

## 2024-01-10 DIAGNOSIS — D12.2 ADENOMATOUS POLYP OF ASCENDING COLON: ICD-10-CM

## 2024-01-10 DIAGNOSIS — K50.80 CROHN'S COLITIS: ICD-10-CM

## 2024-01-10 PROCEDURE — 99214 OFFICE O/P EST MOD 30 MIN: CPT | Performed by: INTERNAL MEDICINE

## 2024-01-10 RX ORDER — ASPIRIN 325 MG/1
1 TABLET TABLET, FILM COATED ORAL ONCE A DAY
Refills: 0 | COMMUNITY
Start: 1900-01-01

## 2024-01-10 RX ORDER — NITROFURANTOIN (MONOHYDRATE/MACROCRYSTALS) 75; 25 MG/1; MG/1
1 CAPSULES CAPSULE ORAL TWICE A DAY
COMMUNITY

## 2024-01-10 RX ORDER — CHOLECALCIFEROL (VITAMIN D3) 50 MCG
1 TABLET CAPSULE ORAL ONCE A DAY
Refills: 0 | COMMUNITY
Start: 1900-01-01

## 2024-01-10 RX ORDER — AZATHIOPRINE 50 MG/1
TAKE 1 TABLET BY MOUTH EVERY DAY TABLET ORAL
Qty: 90 TABLET | Refills: 3 | Status: ACTIVE | COMMUNITY

## 2024-01-10 RX ORDER — ESOMEPRAZOLE MAGNESIUM 40 MG/1
1 CAPSULE CAPSULE, DELAYED RELEASE ORAL ONCE A DAY
Qty: 90 CAPSULE | Refills: 3 | Status: ACTIVE | COMMUNITY

## 2024-01-10 RX ORDER — HYDROCORTISONE 25 MG/G
1 APPLICATION CREAM TOPICAL TWICE A DAY
Qty: 60 GRAM | Refills: 3 | OUTPATIENT
Start: 2024-01-10 | End: 2024-05-09

## 2024-01-10 RX ORDER — SUCRALFATE 1 G/1
TAKE 1 TABLET BY MOUTH TWICE A DAY ON AN EMPTY STOMACH TABLET ORAL
Qty: 60 TABLET | Refills: 1 | COMMUNITY

## 2024-01-10 RX ORDER — TAMSULOSIN HYDROCHLORIDE 0.4 MG/1
TAKE 1 CAPSULE (0.4 MG TOTAL) BY MOUTH DAILY AFTER BREAKFAST CAPSULE ORAL
Qty: 30 EACH | Refills: 0 | Status: ACTIVE | COMMUNITY

## 2024-01-10 RX ORDER — HYOSCYAMINE SULFATE 0.38 MG/1
TAKE 1 TABLET BY MOUTH TWICE A DAY TABLET ORAL
Qty: 60 EACH | Refills: 0 | COMMUNITY

## 2024-01-10 RX ORDER — BACLOFEN 5 MG/1
TAKE 1 TABLET BY MOUTH EVERY DAY WITH FOOD/MILK AS NEEDED TABLET ORAL
Qty: 90 TABLET | Refills: 1 | Status: ACTIVE | COMMUNITY

## 2024-01-10 RX ORDER — CYANOCOBALAMIN (VITAMIN B-12) 100 MCG
1 TABLET TABLET ORAL ONCE A DAY
Refills: 0 | COMMUNITY
Start: 1900-01-01

## 2024-01-10 RX ORDER — POLYETHYLENE GLYCOL 3350 17 G/DOSE
AS DIRECTED PRIOR TO COLONOSCOPY POWDER (GRAM) ORAL ONCE A DAY
Qty: 238  GRAM | Refills: 0 | OUTPATIENT
Start: 2024-01-10 | End: 2024-01-11

## 2024-01-10 RX ORDER — LINACLOTIDE 72 UG/1
1 CAPSULE AT LEAST 30 MINUTES BEFORE THE FIRST MEAL OF THE DAY ON AN EMPTY STOMACH CAPSULE, GELATIN COATED ORAL ONCE A DAY
Qty: 90 CAPSULE | Refills: 3 | Status: ACTIVE | COMMUNITY

## 2024-01-10 RX ORDER — PANTOPRAZOLE SODIUM 40 MG/1
1 TABLET TABLET, DELAYED RELEASE ORAL ONCE A DAY
Qty: 90 TABLET | Refills: 1 | Status: ACTIVE | COMMUNITY

## 2024-01-10 RX ORDER — AZATHIOPRINE 50 MG/1
TAKE 1 TABLET BY MOUTH EVERY DAY TABLET ORAL
Qty: 90 TABLET | Refills: 3
End: 2025-01-04

## 2024-01-10 RX ORDER — ESOMEPRAZOLE MAGNESIUM 40 MG/1
1 CAPSULE CAPSULE, DELAYED RELEASE ORAL ONCE A DAY
COMMUNITY

## 2024-01-10 RX ORDER — MEGESTROL ACETATE 40 MG/1
TAKE 1 TABLET BY MOUTH DAILY IN THE MORNING TABLET ORAL
Qty: 30 EACH | Refills: 4 | COMMUNITY

## 2024-01-10 RX ORDER — FOLIC ACID 1 MG/1
1 TABLET TABLET ORAL ONCE A DAY
COMMUNITY

## 2024-01-10 RX ORDER — AMLODIPINE BESYLATE 10 MG/1
1 TABLET TABLET ORAL ONCE A DAY
Status: ACTIVE | COMMUNITY

## 2024-01-10 RX ORDER — FINASTERIDE 5 MG/1
TAKE 1 TABLET (5 MG TOTAL) BY MOUTH DAILY TABLET, FILM COATED ORAL
Qty: 30 EACH | Refills: 0 | Status: ACTIVE | COMMUNITY

## 2024-01-10 RX ORDER — COLESTIPOL HYDROCHLORIDE 1 G/1
TAKE 1 TABLET BY MOUTH EVERY DAY TABLET, FILM COATED ORAL
Qty: 90 EACH | Refills: 0 | Status: ACTIVE | COMMUNITY

## 2024-01-10 NOTE — HPI-TODAY'S VISIT:
Mr. Butch Aquino is a 77-year-old male with a history of Crohn's disease on Imuran for years came in today along with his brother for evaluation of having chest spasms as well as occasional abdominal bloating and cramping.  Patient has had history coronary artery disease and has been extensively closely followed by the cardiologist and had recent monitoring done which was negative stress test and echocardiogram was unremarkable.  Patient stated he has chronic kidney disease has progressed and hence he is on hemodialysis via PermCath for the past few 6 months at least.  Patient stated he has had a colonoscopy while he was hospitalized for anemia as well as.  Advancing worsening of renal disease.  At that time underwent colonoscopy which revealed the polyp which was villous adenoma in the ileocecal valve and extending into the ascending colon.  He underwent another colonoscopy by advanced endoscopist to the EMR following that patient developed post polypectomy bleed significantly and he had another repeat upper colonoscopy with an Endo Clip placement at the polypectomy site.  Polyp was consistent with a flat tubulovillous adenoma with high-grade dysplasia.  Patient also noted to have a history of chronic Crohn's disease and also has multiple pseudopolyps.  No active Crohn's disease was noted.  Patient was advised to follow-up however had not see any follow-ups ever since last December.  Patient stated he has been busy with the nephrology appointments as well as with the dialysis.  He also reports having some chest spasms and chest tightness however recent cardiac workup was again negative.  Patient denies any unintentional weight loss however he did lose some weight since all his kidney disease symptoms have got worse.  Today he is accompanied by his brother WHO IS HIS CAREGIVER AS WELL.  HE ALSO HAS HISTORY OF GALLSTONES BUT DENIES ANY RIGHT UPPER QUADRANT PAIN.  He reports abdominal bloating and distention and unintentional weight loss.  Patient is on colestipol every day and not sure why he takes that.  He reports having constipation and constipation is true being treated with the stool softeners as well as Linzess 145.  Patient reports that Linzess 145 mcg caused symptoms of diarrhea.  Patient denies any rectal bleeding.  9/27/23; Mrs. Aquino was seen today accompanied by his brother for follow-up after recent hospitalization at [facility] for severe diarrhea at that time he was diagnosed with Campylobacter and E. coli diarrhea gastroenteritis and he was treated with antibiotics.  During that episode he was noted to have acute on chronic renal insufficiency he is on dialysis for end-stage renal disease.  Patient reports fatigue and tiredness which is improving.  Some weight loss which is he is trying to gain weight.  He reports having intermittent chest spasms.  Cardiac work-up again was negative.  Patient also reports having anal spasms at random.  Patient denies any vomiting recently.  Since he has been discharged home he is not has not started back on his azathioprine esomeprazole.  He is scheduled to have a surveillance colonoscopy for Crohn's as well as DALM lesion.  1/10/24: Mr. Aquino was seen today for follow-up accompanied by his brother.  He denies any significant dysphagia however he continues to have off and on chest spasms and intermittent dysphagia and just thought to be related to dysmotility however after discharge gastric dilatation recently with a 20 mm balloon system at least 50% improvement in his dysphagia noted.  He also have Crohn's disease and has been on Imuran daily.  Patient denies any diarrhea [id number] he gets constipated and hence he gets rectal discomfort and pain.  Current medications were reviewed.  He takes MiraLAX daily.  He was also noted to have a very large flat polyp in the ascending colon that was removed in piecemeal at [facility] followed by post polypectomy bleed and hence he had endoclips were placed.  Repeat colonoscopy recently revealed still polyp around the polypectomy site and consistent with tubular tubular adenoma.  There were 2 buried buried endoclips were noted.  Patient denies any nausea vomiting.  He has end-stage renal disease and has been on hemodialysis.  His current medications were reviewed once again.

## 2024-01-30 ENCOUNTER — OFFICE VISIT (OUTPATIENT)
Dept: URBAN - METROPOLITAN AREA CLINIC 82 | Facility: CLINIC | Age: 79
End: 2024-01-30
Payer: MEDICARE

## 2024-01-30 VITALS
SYSTOLIC BLOOD PRESSURE: 124 MMHG | TEMPERATURE: 98.1 F | HEART RATE: 58 BPM | BODY MASS INDEX: 22.29 KG/M2 | HEIGHT: 72 IN | WEIGHT: 164.6 LBS | DIASTOLIC BLOOD PRESSURE: 74 MMHG

## 2024-01-30 DIAGNOSIS — K50.80 CROHN'S COLITIS: ICD-10-CM

## 2024-01-30 DIAGNOSIS — K64.8 HEMORRHOIDS, INTERNAL: ICD-10-CM

## 2024-01-30 PROBLEM — 90458007: Status: ACTIVE | Noted: 2024-01-30

## 2024-01-30 PROCEDURE — 99214 OFFICE O/P EST MOD 30 MIN: CPT | Performed by: STUDENT IN AN ORGANIZED HEALTH CARE EDUCATION/TRAINING PROGRAM

## 2024-01-30 RX ORDER — AZATHIOPRINE 50 MG/1
TAKE 1 TABLET BY MOUTH EVERY DAY TABLET ORAL
Qty: 90 TABLET | Refills: 3 | Status: ACTIVE | COMMUNITY
End: 2025-01-04

## 2024-01-30 RX ORDER — DICYCLOMINE HYDROCHLORIDE 20 MG/1
1 TABLET TABLET ORAL THREE TIMES A DAY
Status: ACTIVE | COMMUNITY

## 2024-01-30 RX ORDER — COLESTIPOL HYDROCHLORIDE 1 G/1
TAKE 1 TABLET BY MOUTH EVERY DAY TABLET, FILM COATED ORAL
Qty: 90 EACH | Refills: 0 | Status: DISCONTINUED | COMMUNITY

## 2024-01-30 RX ORDER — CYANOCOBALAMIN (VITAMIN B-12) 100 MCG
1 TABLET TABLET ORAL ONCE A DAY
Refills: 0 | Status: ACTIVE | COMMUNITY
Start: 1900-01-01

## 2024-01-30 RX ORDER — CHOLECALCIFEROL (VITAMIN D3) 50 MCG
1 TABLET CAPSULE ORAL ONCE A DAY
Refills: 0 | Status: DISCONTINUED | COMMUNITY
Start: 1900-01-01

## 2024-01-30 RX ORDER — NITROFURANTOIN (MONOHYDRATE/MACROCRYSTALS) 75; 25 MG/1; MG/1
1 CAPSULES CAPSULE ORAL TWICE A DAY
Status: DISCONTINUED | COMMUNITY

## 2024-01-30 RX ORDER — PANTOPRAZOLE SODIUM 40 MG/1
1 TABLET TABLET, DELAYED RELEASE ORAL ONCE A DAY
Qty: 90 TABLET | Refills: 1 | Status: DISCONTINUED | COMMUNITY

## 2024-01-30 RX ORDER — ASPIRIN 325 MG/1
1 TABLET TABLET, FILM COATED ORAL ONCE A DAY
Refills: 0 | Status: DISCONTINUED | COMMUNITY
Start: 1900-01-01

## 2024-01-30 RX ORDER — HYDROCORTISONE 25 MG/G
1 APPLICATION CREAM TOPICAL TWICE A DAY
Qty: 60 GRAM | Refills: 3 | OUTPATIENT

## 2024-01-30 RX ORDER — BACLOFEN 5 MG/1
TAKE 1 TABLET BY MOUTH EVERY DAY WITH FOOD/MILK AS NEEDED TABLET ORAL
Qty: 90 TABLET | Refills: 1 | Status: ACTIVE | COMMUNITY

## 2024-01-30 RX ORDER — FOLIC ACID 1 MG/1
1 TABLET TABLET ORAL ONCE A DAY
Status: ACTIVE | COMMUNITY

## 2024-01-30 RX ORDER — TAMSULOSIN HYDROCHLORIDE 0.4 MG/1
TAKE 1 CAPSULE (0.4 MG TOTAL) BY MOUTH DAILY AFTER BREAKFAST CAPSULE ORAL
Qty: 30 EACH | Refills: 0 | Status: DISCONTINUED | COMMUNITY

## 2024-01-30 RX ORDER — ESOMEPRAZOLE MAGNESIUM 40 MG/1
1 CAPSULE CAPSULE, DELAYED RELEASE ORAL ONCE A DAY
Status: DISCONTINUED | COMMUNITY

## 2024-01-30 RX ORDER — SUCRALFATE 1 G/1
TAKE 1 TABLET BY MOUTH TWICE A DAY ON AN EMPTY STOMACH TABLET ORAL
Qty: 60 TABLET | Refills: 1 | Status: DISCONTINUED | COMMUNITY

## 2024-01-30 RX ORDER — MEGESTROL ACETATE 40 MG/1
TAKE 1 TABLET BY MOUTH DAILY IN THE MORNING TABLET ORAL
Qty: 30 EACH | Refills: 4 | Status: DISCONTINUED | COMMUNITY

## 2024-01-30 RX ORDER — HYDROCORTISONE 25 MG/G
1 APPLICATION CREAM TOPICAL TWICE A DAY
Qty: 60 GRAM | Refills: 3 | Status: ACTIVE | COMMUNITY
Start: 2024-01-10 | End: 2024-05-09

## 2024-01-30 RX ORDER — HYOSCYAMINE SULFATE 0.38 MG/1
TAKE 1 TABLET BY MOUTH TWICE A DAY TABLET ORAL
Qty: 60 EACH | Refills: 0 | Status: DISCONTINUED | COMMUNITY

## 2024-01-30 RX ORDER — ESOMEPRAZOLE MAGNESIUM 40 MG/1
1 CAPSULE CAPSULE, DELAYED RELEASE ORAL ONCE A DAY
Qty: 90 CAPSULE | Refills: 3 | Status: ACTIVE | COMMUNITY

## 2024-01-30 RX ORDER — LINACLOTIDE 72 UG/1
1 CAPSULE AT LEAST 30 MINUTES BEFORE THE FIRST MEAL OF THE DAY ON AN EMPTY STOMACH CAPSULE, GELATIN COATED ORAL ONCE A DAY
Qty: 90 CAPSULE | Refills: 3 | Status: ACTIVE | COMMUNITY

## 2024-01-30 RX ORDER — FINASTERIDE 5 MG/1
TAKE 1 TABLET (5 MG TOTAL) BY MOUTH DAILY TABLET, FILM COATED ORAL
Qty: 30 EACH | Refills: 0 | Status: DISCONTINUED | COMMUNITY

## 2024-01-30 RX ORDER — AMLODIPINE BESYLATE 10 MG/1
1 TABLET TABLET ORAL ONCE A DAY
Status: DISCONTINUED | COMMUNITY

## 2024-01-30 NOTE — HPI-TODAY'S VISIT:
78 y.o male w/ PMH of Crohn's disease s/p Imuran daily, ESRD s/p hemodialysis presents today for concerns of rectal bleeding.  Patient was last seen in office 20 days ago by Dr. Gregory. Recap of that visit: here for F/U. He denies any significant dysphagia however he continues to have off and on chest spasms and intermittent dysphagia and just thought to be related to dysmotility however after discharge gastric dilatation recently with a 20 mm balloon system at least 50% improvement in his dysphagia noted. Patient denies any diarrhea [id number] he gets constipated and hence he gets rectal discomfort and pain. Current medications were reviewed. He takes MiraLAX daily. He was also noted to have a very large flat polyp in the ascending colon that was removed in piecemeal at [facility] followed by post polypectomy bleed and hence he had endoclips were placed. Repeat colonoscopy recently revealed still polyp around the polypectomy site and consistent with tubular tubular adenoma. There were 2 buried buried endoclips were noted. Patient denies any nausea vomiting.     At this visit, patient states that yesterday, had a episode of BRBPR on toilet paper. Only 1 episode. No blood on toilet commode. This morning, had no blood on toilet paper. Denies any constipation. BM soft. No abd pain, nv, fever, weakness. Has not been using rx hemorrhoids cream.

## 2024-03-21 ENCOUNTER — OV EP (OUTPATIENT)
Dept: URBAN - METROPOLITAN AREA CLINIC 82 | Facility: CLINIC | Age: 79
End: 2024-03-21
Payer: MEDICARE

## 2024-03-21 VITALS
TEMPERATURE: 97.8 F | SYSTOLIC BLOOD PRESSURE: 111 MMHG | HEART RATE: 71 BPM | BODY MASS INDEX: 21.81 KG/M2 | DIASTOLIC BLOOD PRESSURE: 67 MMHG | HEIGHT: 72 IN | WEIGHT: 161 LBS

## 2024-03-21 DIAGNOSIS — D53.1 MEGALOBLASTIC ANEMIA: ICD-10-CM

## 2024-03-21 DIAGNOSIS — D12.6 TUBULAR ADENOMA OF COLON: ICD-10-CM

## 2024-03-21 DIAGNOSIS — R19.5 DARK STOOLS: ICD-10-CM

## 2024-03-21 DIAGNOSIS — K22.2 SCHATZKI'S RING: ICD-10-CM

## 2024-03-21 DIAGNOSIS — K21.9 GERD: ICD-10-CM

## 2024-03-21 DIAGNOSIS — K22.4 ESOPHAGEAL SPASM: ICD-10-CM

## 2024-03-21 DIAGNOSIS — K80.20 GALLSTONES: ICD-10-CM

## 2024-03-21 DIAGNOSIS — K50.90 CROHN DISEASE: ICD-10-CM

## 2024-03-21 PROCEDURE — 99214 OFFICE O/P EST MOD 30 MIN: CPT | Performed by: INTERNAL MEDICINE

## 2024-03-21 RX ORDER — FOLIC ACID 1 MG/1
1 TABLET TABLET ORAL ONCE A DAY
Status: ACTIVE | COMMUNITY

## 2024-03-21 RX ORDER — ESOMEPRAZOLE MAGNESIUM 40 MG/1
1 CAPSULE CAPSULE, DELAYED RELEASE ORAL ONCE A DAY
Qty: 90 CAPSULE | Refills: 3 | Status: ACTIVE | COMMUNITY

## 2024-03-21 RX ORDER — BACLOFEN 5 MG/1
TAKE 1 TABLET BY MOUTH EVERY DAY WITH FOOD/MILK AS NEEDED TABLET ORAL
Qty: 90 TABLET | Refills: 1 | Status: ACTIVE | COMMUNITY

## 2024-03-21 RX ORDER — DICYCLOMINE HYDROCHLORIDE 20 MG/1
1 TABLET TABLET ORAL THREE TIMES A DAY
Status: ACTIVE | COMMUNITY

## 2024-03-21 RX ORDER — CYANOCOBALAMIN (VITAMIN B-12) 100 MCG
1 TABLET TABLET ORAL ONCE A DAY
Refills: 0 | Status: ACTIVE | COMMUNITY
Start: 1900-01-01

## 2024-03-21 RX ORDER — LINACLOTIDE 72 UG/1
1 CAPSULE AT LEAST 30 MINUTES BEFORE THE FIRST MEAL OF THE DAY ON AN EMPTY STOMACH CAPSULE, GELATIN COATED ORAL ONCE A DAY
Qty: 90 CAPSULE | Refills: 3 | Status: ACTIVE | COMMUNITY

## 2024-03-21 RX ORDER — HYDROCORTISONE 25 MG/G
1 APPLICATION CREAM TOPICAL TWICE A DAY
Qty: 60 GRAM | Refills: 3 | Status: ACTIVE | COMMUNITY

## 2024-03-21 RX ORDER — AZATHIOPRINE 50 MG/1
TAKE 1 TABLET BY MOUTH EVERY DAY TABLET ORAL
Qty: 90 TABLET | Refills: 3 | Status: ACTIVE | COMMUNITY
End: 2025-01-04

## 2024-03-21 NOTE — PHYSICAL EXAM GASTROINTESTINAL
Abdomen soft, nontender in RUQ/RLQ/LUQ/LLQ, nondistended , no guarding or rigidity , no masses palpable , unremarkable bowel sounds , no hepatosplenomegaly rectal : poor sphincter tone , brown liquid stool noted

## 2024-03-21 NOTE — HPI-TODAY'S VISIT:
78 y.o male w/ PMH of Crohn's disease s/p Imuran daily, ESRD s/p hemodialysis presents today for concerns of rectal bleeding.  Patient was last seen in office 20 days ago by Dr. Gregory. Recap of that visit: here for F/U. He denies any significant dysphagia however he continues to have off and on chest spasms and intermittent dysphagia and just thought to be related to dysmotility however after discharge gastric dilatation recently with a 20 mm balloon system at least 50% improvement in his dysphagia noted. Patient denies any diarrhea [id number] he gets constipated and hence he gets rectal discomfort and pain. Current medications were reviewed. He takes MiraLAX daily. He was also noted to have a very large flat polyp in the ascending colon that was removed in piecemeal at [facility] followed by post polypectomy bleed and hence he had endoclips were placed. Repeat colonoscopy recently revealed still polyp around the [id number] site and consistent with tubular tubular adenoma. There were 2 buried buried endoclips were noted. Patient denies any nausea vomiting.     At this visit, patient states that yesterday, had a episode of BRBPR on toilet paper. Only 1 episode. No blood on toilet commode. This morning, had no blood on toilet paper. Denies any constipation. BM soft. No abd pain, nv, fever, weakness. Has not been using rx hemorrhoids cream.  3/21/24 : Butch Aquino is a 78-year-old male with a history of Crohn's disease as well as a large flat polyp in ascending colon and chest spasms came into the office accompanied by his brother for evaluation of dark stools.  He reports having dark stools upon further questioning he admits for taking Pepto-Bismol for stomach upset.  He reports having intermittent chest spasms.  He reports occasional incontinence of the stools when he has any loose stools or hard stools.  No recent weight loss.  Follow-up surveillance colonoscopy showed a residual polyp in the ascending colon.  No evidence of dysplasia and no evidence of active Crohn's around the polyp site suggestive of isolated adenoma than DALM.  Patient has been on hemodialysis.

## 2024-03-26 PROBLEM — 444898006: Status: ACTIVE | Noted: 2024-03-26

## 2024-04-03 ENCOUNTER — COLON (OUTPATIENT)
Dept: URBAN - METROPOLITAN AREA MEDICAL CENTER 31 | Facility: MEDICAL CENTER | Age: 79
End: 2024-04-03

## 2024-05-13 ENCOUNTER — OFFICE VISIT (OUTPATIENT)
Dept: URBAN - METROPOLITAN AREA MEDICAL CENTER 31 | Facility: MEDICAL CENTER | Age: 79
End: 2024-05-13
Payer: MEDICARE

## 2024-05-13 DIAGNOSIS — D12.3 ADENOMA OF TRANSVERSE COLON: ICD-10-CM

## 2024-05-13 DIAGNOSIS — D12.2 ADENOMA OF ASCENDING COLON: ICD-10-CM

## 2024-05-13 DIAGNOSIS — Z86.010 ADENOMAS PERSONAL HISTORY OF COLONIC POLYPS: ICD-10-CM

## 2024-05-13 PROCEDURE — 45385 COLONOSCOPY W/LESION REMOVAL: CPT | Performed by: INTERNAL MEDICINE

## 2024-05-13 PROCEDURE — 45380 COLONOSCOPY AND BIOPSY: CPT | Performed by: INTERNAL MEDICINE

## 2024-05-13 RX ORDER — DICYCLOMINE HYDROCHLORIDE 20 MG/1
1 TABLET TABLET ORAL THREE TIMES A DAY
Status: ACTIVE | COMMUNITY

## 2024-05-13 RX ORDER — LINACLOTIDE 72 UG/1
1 CAPSULE AT LEAST 30 MINUTES BEFORE THE FIRST MEAL OF THE DAY ON AN EMPTY STOMACH CAPSULE, GELATIN COATED ORAL ONCE A DAY
Qty: 90 CAPSULE | Refills: 3 | Status: ACTIVE | COMMUNITY

## 2024-05-13 RX ORDER — BACLOFEN 5 MG/1
TAKE 1 TABLET BY MOUTH EVERY DAY WITH FOOD/MILK AS NEEDED TABLET ORAL
Qty: 90 TABLET | Refills: 1 | Status: ACTIVE | COMMUNITY

## 2024-05-13 RX ORDER — AZATHIOPRINE 50 MG/1
TAKE 1 TABLET BY MOUTH EVERY DAY TABLET ORAL
Qty: 90 TABLET | Refills: 3 | Status: ACTIVE | COMMUNITY
End: 2025-01-04

## 2024-05-13 RX ORDER — ESOMEPRAZOLE MAGNESIUM 40 MG/1
1 CAPSULE CAPSULE, DELAYED RELEASE ORAL ONCE A DAY
Qty: 90 CAPSULE | Refills: 3 | Status: ACTIVE | COMMUNITY

## 2024-05-13 RX ORDER — HYDROCORTISONE 25 MG/G
1 APPLICATION CREAM TOPICAL TWICE A DAY
Qty: 60 GRAM | Refills: 3 | Status: ACTIVE | COMMUNITY

## 2024-05-13 RX ORDER — CYANOCOBALAMIN (VITAMIN B-12) 100 MCG
1 TABLET TABLET ORAL ONCE A DAY
Refills: 0 | Status: ACTIVE | COMMUNITY
Start: 1900-01-01

## 2024-05-13 RX ORDER — FOLIC ACID 1 MG/1
1 TABLET TABLET ORAL ONCE A DAY
Status: ACTIVE | COMMUNITY

## 2024-07-03 ENCOUNTER — DASHBOARD ENCOUNTERS (OUTPATIENT)
Age: 79
End: 2024-07-03

## 2024-07-03 ENCOUNTER — OFFICE VISIT (OUTPATIENT)
Dept: URBAN - METROPOLITAN AREA CLINIC 115 | Facility: CLINIC | Age: 79
End: 2024-07-03
Payer: MEDICARE

## 2024-07-03 VITALS
HEIGHT: 72 IN | WEIGHT: 156 LBS | BODY MASS INDEX: 21.13 KG/M2 | TEMPERATURE: 96.9 F | DIASTOLIC BLOOD PRESSURE: 72 MMHG | HEART RATE: 75 BPM | SYSTOLIC BLOOD PRESSURE: 129 MMHG

## 2024-07-03 DIAGNOSIS — K22.4 ESOPHAGEAL DYSMOTILITY: ICD-10-CM

## 2024-07-03 DIAGNOSIS — K21.9 GERD: ICD-10-CM

## 2024-07-03 DIAGNOSIS — K64.8 EXTERNAL HEMORRHOIDS: ICD-10-CM

## 2024-07-03 DIAGNOSIS — K50.80 CROHN'S COLITIS: ICD-10-CM

## 2024-07-03 PROBLEM — 266434009: Status: ACTIVE | Noted: 2024-07-03

## 2024-07-03 PROCEDURE — 99214 OFFICE O/P EST MOD 30 MIN: CPT | Performed by: INTERNAL MEDICINE

## 2024-07-03 RX ORDER — DICYCLOMINE HYDROCHLORIDE 20 MG/1
1 TABLET TABLET ORAL THREE TIMES A DAY
Status: ACTIVE | COMMUNITY

## 2024-07-03 RX ORDER — HYDROCORTISONE 25 MG/G
1 APPLICATION CREAM TOPICAL TWICE A DAY
Qty: 60 GRAM | Refills: 3 | Status: ACTIVE | COMMUNITY

## 2024-07-03 RX ORDER — FOLIC ACID 1 MG/1
1 TABLET TABLET ORAL ONCE A DAY
Status: ACTIVE | COMMUNITY

## 2024-07-03 RX ORDER — LINACLOTIDE 72 UG/1
1 CAPSULE AT LEAST 30 MINUTES BEFORE THE FIRST MEAL OF THE DAY ON AN EMPTY STOMACH CAPSULE, GELATIN COATED ORAL ONCE A DAY
Qty: 90 CAPSULE | Refills: 3 | Status: ACTIVE | COMMUNITY

## 2024-07-03 RX ORDER — AZATHIOPRINE 50 MG/1
TAKE 1 TABLET BY MOUTH EVERY DAY TABLET ORAL
Qty: 90 TABLET | Refills: 3 | Status: ACTIVE | COMMUNITY
End: 2025-01-04

## 2024-07-03 RX ORDER — ESOMEPRAZOLE MAGNESIUM 40 MG/1
1 CAPSULE CAPSULE, DELAYED RELEASE ORAL ONCE A DAY
Qty: 90 CAPSULE | Refills: 3

## 2024-07-03 RX ORDER — BACLOFEN 5 MG/1
TAKE 1 TABLET BY MOUTH EVERY DAY WITH FOOD/MILK AS NEEDED TABLET ORAL
Qty: 90 TABLET | Refills: 1 | Status: ACTIVE | COMMUNITY

## 2024-07-03 RX ORDER — ESOMEPRAZOLE MAGNESIUM 40 MG/1
1 CAPSULE CAPSULE, DELAYED RELEASE ORAL ONCE A DAY
Qty: 90 CAPSULE | Refills: 3 | Status: ACTIVE | COMMUNITY

## 2024-07-03 RX ORDER — CYANOCOBALAMIN (VITAMIN B-12) 100 MCG
1 TABLET TABLET ORAL ONCE A DAY
Refills: 0 | Status: ACTIVE | COMMUNITY
Start: 1900-01-01

## 2024-07-03 RX ORDER — DULOXETINE HYDROCHLORIDE 20 MG/1
1 CAPSULE CAPSULE, DELAYED RELEASE ORAL ONCE A DAY
Qty: 30 CAPSULE | Refills: 5 | OUTPATIENT
Start: 2024-07-03

## 2024-07-03 RX ORDER — HYDROCORTISONE 25 MG/G
1 APPLICATION CREAM TOPICAL TWICE A DAY
Qty: 60 GRAM | Refills: 3
End: 2024-10-31

## 2024-07-03 RX ORDER — AZATHIOPRINE 50 MG/1
TAKE 1 TABLET BY MOUTH EVERY DAY TABLET ORAL
Qty: 90 TABLET | Refills: 3
End: 2025-06-28

## 2024-07-03 NOTE — HPI-TODAY'S VISIT:
78 y.o male w/ PMH of Crohn's disease s/p Imuran daily, ESRD s/p hemodialysis presents today for concerns of rectal bleeding.  Patient was last seen in office 20 days ago by Dr. Gregory. Recap of that visit: here for F/U. He denies any significant dysphagia however he continues to have off and on chest spasms and intermittent dysphagia and just thought to be related to dysmotility however after discharge gastric dilatation recently with a 20 mm balloon system at least 50% improvement in his dysphagia noted. Patient denies any diarrhea [id number] he gets constipated and hence he gets rectal discomfort and pain. Current medications were reviewed. He takes MiraLAX daily. He was also noted to have a very large flat polyp in the ascending colon that was removed in piecemeal at [facility] followed by post polypectomy bleed and hence he had endoclips were placed. Repeat colonoscopy recently revealed still polyp around the [id number] site and consistent with tubular tubular adenoma. There were 2 buried buried endoclips were noted. Patient denies any nausea vomiting.     At this visit, patient states that yesterday, had a episode of BRBPR on toilet paper. Only 1 episode. No blood on toilet commode. This morning, had no blood on toilet paper. Denies any constipation. BM soft. No abd pain, nv, fever, weakness. Has not been using rx hemorrhoids cream.  3/21/24 : Butch Aquino is a 78-year-old male with a history of Crohn's disease as well as a large flat polyp in ascending colon and chest spasms came into the office accompanied by his brother for evaluation of dark stools.  He reports having dark stools upon further questioning he admits for taking Pepto-Bismol for stomach upset.  He reports having intermittent chest spasms.  He reports occasional incontinence of the stools when he has any loose stools or hard stools.  No recent weight loss.  Follow-up surveillance colonoscopy showed a residual polyp in the ascending colon.  No evidence of dysplasia and no evidence of active Crohn's around the polyp site suggestive of isolated adenoma than DALM.  Patient has been on hemodialysis.  7/3/24 : Mr. Aquino was seen today accompanied by his brother for follow-up after his colonoscopy evaluation.  Patient has still had a buried carpeted polyp in 2 clip area at the post polypectomy site if she is not able to be removed.  Rest of the biopsies for only showed remission of Crohn's and no evidence of dysplasia.  Patient other complaints include chest spasms amitriptyline is helping symptoms.  Patient denies any new complaints.  Patient has seen colorectal surgeon.  He also reports intermittent rectal spasms he is anxious.  Prior workup was all reviewed with him.

## 2024-08-23 ENCOUNTER — TELEPHONE ENCOUNTER (OUTPATIENT)
Dept: URBAN - METROPOLITAN AREA CLINIC 115 | Facility: CLINIC | Age: 79
End: 2024-08-23

## 2024-09-13 NOTE — PHYSICAL EXAM CONSTITUTIONAL:
<<----- Click to Select Surgeon pleasant, well nourished, well developed, in no acute distress , normal communication ability Gerardo Rubio (Attending)